# Patient Record
Sex: MALE | Race: OTHER | NOT HISPANIC OR LATINO | Employment: FULL TIME | URBAN - METROPOLITAN AREA
[De-identification: names, ages, dates, MRNs, and addresses within clinical notes are randomized per-mention and may not be internally consistent; named-entity substitution may affect disease eponyms.]

---

## 2017-07-05 ENCOUNTER — APPOINTMENT (OUTPATIENT)
Dept: LAB | Age: 52
End: 2017-07-05
Payer: COMMERCIAL

## 2017-07-05 ENCOUNTER — TRANSCRIBE ORDERS (OUTPATIENT)
Dept: ADMINISTRATIVE | Age: 52
End: 2017-07-05

## 2017-07-05 DIAGNOSIS — Z00.8 HEALTH EXAMINATION IN POPULATION SURVEY: Primary | ICD-10-CM

## 2017-07-05 DIAGNOSIS — Z00.8 HEALTH EXAMINATION IN POPULATION SURVEY: ICD-10-CM

## 2017-07-05 LAB
CHOLEST SERPL-MCNC: 199 MG/DL (ref 50–200)
EST. AVERAGE GLUCOSE BLD GHB EST-MCNC: 105 MG/DL
HBA1C MFR BLD: 5.3 % (ref 4.2–6.3)
HDLC SERPL-MCNC: 38 MG/DL (ref 40–60)
LDLC SERPL CALC-MCNC: 135 MG/DL (ref 0–100)
TRIGL SERPL-MCNC: 128 MG/DL

## 2017-07-05 PROCEDURE — 36415 COLL VENOUS BLD VENIPUNCTURE: CPT

## 2017-07-05 PROCEDURE — 83036 HEMOGLOBIN GLYCOSYLATED A1C: CPT

## 2017-07-05 PROCEDURE — 80061 LIPID PANEL: CPT

## 2017-08-29 ENCOUNTER — TRANSCRIBE ORDERS (OUTPATIENT)
Dept: ADMINISTRATIVE | Facility: HOSPITAL | Age: 52
End: 2017-08-29

## 2017-08-29 DIAGNOSIS — Z82.49 FAMILY HISTORY OF CAROTID ARTERY STENOSIS: Primary | ICD-10-CM

## 2017-08-30 ENCOUNTER — HOSPITAL ENCOUNTER (OUTPATIENT)
Dept: NON INVASIVE DIAGNOSTICS | Facility: CLINIC | Age: 52
Discharge: HOME | End: 2017-08-30
Payer: COMMERCIAL

## 2017-08-30 DIAGNOSIS — Z82.49 FAMILY HISTORY OF CAROTID ARTERY STENOSIS: ICD-10-CM

## 2017-08-30 PROCEDURE — 93880 EXTRACRANIAL BILAT STUDY: CPT

## 2017-10-09 ENCOUNTER — OFFICE VISIT (OUTPATIENT)
Dept: URGENT CARE | Age: 52
End: 2017-10-09
Payer: COMMERCIAL

## 2017-10-09 PROCEDURE — S9083 URGENT CARE CENTER GLOBAL: HCPCS | Performed by: FAMILY MEDICINE

## 2017-10-09 PROCEDURE — G0382 LEV 3 HOSP TYPE B ED VISIT: HCPCS | Performed by: FAMILY MEDICINE

## 2017-10-10 NOTE — PROGRESS NOTES
Assessment  1  Poison ivy (692 6) (L23 7)    Plan  Poison ivy    · PredniSONE 10 MG Oral Tablet; TAKE 6 TABLETS TODAY, THEN DECREASE BY 1  TABLET EACH DAY UNTIL GONE   · MethylPREDNISolone Acetate 40 MG/ML Injection Suspension; INJECT 2  ML  Intramuscular give now; To Be Done: 43VLW0228   · Apply an ice pack as needed for pain twice a day for 20 minutes ; Status:Complete;    Done: 25MIW2010   · Continue with our present treatment plan ; Status:Complete;   Done: 87LHU5473   · Resume activity to your tolerance ; Status:Complete;   Done: 26JQT3162    Discussion/Summary  Discussion Summary:   Continue over-the-counter Benadryl as directed  Medication Side Effects Reviewed: Possible side effects of new medications were reviewed with the patient/guardian today  Understands and agrees with treatment plan: The treatment plan was reviewed with the patient/guardian  The patient/guardian understands and agrees with the treatment plan   Counseling Documentation With Imm: The patient was counseled regarding instructions for management,-prognosis,-patient and family education,-impressions,-risks and benefits of treatment options,-importance of compliance with treatment  Follow Up Instructions: Follow Up with your Primary Care Provider in 3-4 days  If your symptoms worsen, go to the nearest [place] [address] Emergency Department  Chief Complaint  1  Rash  Chief Complaint Free Text Note Form: Since Friday, has red, flat rash on hands, arms and face -(around cheeks, nose , lower eye lids and forehead(  Very itchy,  Removing shrubs from home  Took Benadryl 50 mg at 6 am       History of Present Illness  HPI: Patient was working in his yard removing shrubs from his home when he noted a couple days later with had a rash on his face, arms and hands   Rash is itchy and he has swelling around his eyes Stella, which is relieved with Benadryl   Hospital Based Practices Required Assessment:   Pain Assessment   the patient states they have pain  The pain is located in the rash  The patient describes the pain as itchy  (on a scale of 0 to 10, the patient rates the pain at 8 )   Abuse And Domestic Violence Screen    Yes, the patient is safe at home -The patient states no one is hurting them  Depression And Suicide Screen  No, the patient has not had thoughts of hurting themself  No, the patient has not felt depressed in the past 7 days  Prefered Language is  [place]  Primary Language is  English  Rash: Yfn Tobar presents with complaints of rash  Associated symptoms include skin blistering-and-pruritus, but-no skin bumps,-no cracking,-no crusting,-no draining,-no skin dryness,-no skin oiliness,-no pain,-no skin redness,-no skin scaling,-no skin swelling,-no skin ulceration,-no nausea,-no pustules,-no purulent drainage-and-no serous discharge  Review of Systems  Focused-Male:   Constitutional: as noted in HPI    ENT: as noted in HPI  Musculoskeletal: as noted in HPI  Integumentary: as noted in HPI  Active Problems  1  Hypertension (401 9) (I10)   2  Seasonal allergies (477 9) (J30 2)    Social History   · Denied: History of Drug use   · Never a smoker   · Social alcohol use (Z78 9)  Social History Reviewed: The social history was reviewed and updated today  Surgical History  1  History of Knee Arthroscopy With Medial Meniscectomy   2  History of Laminectomy Lumbar   3  History of Primary Repair Of Knee Ligament Cruciate Anterior  Surgical History Reviewed: The surgical history was reviewed and updated today  Current Meds   1  Allegra Allergy 180 MG Oral Tablet; Therapy: (Recorded:27Wsg6765) to Recorded   2  Lisinopril 10 MG Oral Tablet; Therapy: ([phone]) to Recorded  Medication List Reviewed: The medication list was reviewed and updated today  Allergies  1   No Known Drug Allergies    Vitals  Signs   Recorded: 09VVW6829 11:29AM   Temperature: 98 F, Oral  Heart Rate: 83  Pulse Quality: Regular  Respiration: 18  Systolic: 412, RUE, Sitting  Diastolic: 76, RUE, Sitting  Height: 5 ft 11 in  Weight: 247 lb 3 2 oz  BMI Calculated: 34 48  BSA Calculated: 2 31  O2 Saturation: 97  Pain Scale: 8    Physical Exam    Constitutional   General appearance: No acute distress, well appearing and well nourished  Eyes   Conjunctiva and lids: No swelling, erythema, or discharge  Pupils and irises: Equal, round and reactive to light  Skin Maculopapular vesicular rash on the patient's arms, hands, neck and face  Neurologic   Sensation: No sensory loss      Psychiatric   Orientation to person, place and time: Normal     Mood and affect: Normal        Signatures   Electronically signed by : MARY Pierre; Oct  9 2017 11:56AM EST                       (Author)    Electronically signed by : Jo Ann Carrasquillo DO; Oct  9 2017  2:57PM EST                       (Co-author)

## 2017-10-26 ENCOUNTER — APPOINTMENT (OUTPATIENT)
Dept: PHYSICAL THERAPY | Facility: OTHER | Age: 52
End: 2017-10-26
Payer: COMMERCIAL

## 2017-10-26 PROCEDURE — G8991 OTHER PT/OT GOAL STATUS: HCPCS

## 2017-10-26 PROCEDURE — 97161 PT EVAL LOW COMPLEX 20 MIN: CPT

## 2017-10-26 PROCEDURE — G8990 OTHER PT/OT CURRENT STATUS: HCPCS

## 2017-10-26 PROCEDURE — 97140 MANUAL THERAPY 1/> REGIONS: CPT

## 2017-10-31 ENCOUNTER — APPOINTMENT (OUTPATIENT)
Dept: PHYSICAL THERAPY | Facility: OTHER | Age: 52
End: 2017-10-31
Payer: COMMERCIAL

## 2017-10-31 PROCEDURE — 97110 THERAPEUTIC EXERCISES: CPT

## 2017-10-31 PROCEDURE — 97140 MANUAL THERAPY 1/> REGIONS: CPT

## 2017-11-02 ENCOUNTER — APPOINTMENT (OUTPATIENT)
Dept: PHYSICAL THERAPY | Facility: OTHER | Age: 52
End: 2017-11-02
Payer: COMMERCIAL

## 2017-11-02 PROCEDURE — 97140 MANUAL THERAPY 1/> REGIONS: CPT

## 2017-11-02 PROCEDURE — 97110 THERAPEUTIC EXERCISES: CPT

## 2017-11-02 PROCEDURE — 97112 NEUROMUSCULAR REEDUCATION: CPT

## 2017-11-05 ENCOUNTER — OFFICE VISIT (OUTPATIENT)
Dept: URGENT CARE | Age: 52
End: 2017-11-05
Payer: COMMERCIAL

## 2017-11-05 PROCEDURE — S9083 URGENT CARE CENTER GLOBAL: HCPCS | Performed by: FAMILY MEDICINE

## 2017-11-05 PROCEDURE — G0382 LEV 3 HOSP TYPE B ED VISIT: HCPCS | Performed by: FAMILY MEDICINE

## 2017-11-07 ENCOUNTER — APPOINTMENT (OUTPATIENT)
Dept: PHYSICAL THERAPY | Facility: OTHER | Age: 52
End: 2017-11-07
Payer: COMMERCIAL

## 2017-11-07 NOTE — PROGRESS NOTES
Assessment  1  Acute sinusitis (461 9) (J01 90)   2  Acute conjunctivitis (372 00) (H10 30)    Plan  Acute conjunctivitis    · Tobramycin 0 3 % Ophthalmic Solution; INITIALLY 2 DROPS EVERY HOUR FOR  DAY 1, THEN 2 DROPS 4 TIMES DAILY  Acute sinusitis    · Amoxicillin-Pot Clavulanate 875-125 MG Oral Tablet (Augmentin); Take 1 tablet  twice daily    Discussion/Summary  Discussion Summary:   Start antibiotic and take as directed  Use drops in eyes  Wash hands frequently to prevent spread of infection  If symptoms are not improving over the next 5-7 days, follow with PCP  Medication Side Effects Reviewed: Possible side effects of new medications were reviewed with the patient/guardian today  Understands and agrees with treatment plan: The treatment plan was reviewed with the patient/guardian  The patient/guardian understands and agrees with the treatment plan   Follow Up Instructions: Follow Up with your Primary Care Provider in 5-7 days  If your symptoms worsen, go to the nearest [facility] Emergency Department  Chief Complaint  1  Cough  Chief Complaint Free Text Note Form: pt reports cough since Monday and unable to sleep due to the post nasal drip      History of Present Illness  HPI: 76-year-old male here with complaint of cough for the last week  Unable to sleep due to postnasal drip  Denies any fever or chills  Cough is productive with yellow sputum  Also has sinus pressure and headaches  Feels like his eyes were pasted shut this morning  Hospital Based Practices Required Assessment:   Pain Assessment   the patient states they do not have pain  (on a scale of 0 to 10, the patient rates the pain at 0 )   Abuse And Domestic Violence Screen    Yes, the patient is safe at home  -- The patient states no one is hurting them  Depression And Suicide Screen  No, the patient has not had thoughts of hurting themself  No, the patient has not felt depressed in the past 7 days     Prefered Language is English  Primary Language is  English  Cough: Jose Martin Lebron presents with complaints of cough  Associated symptoms include runny nose,-- stuffy nose,-- sore throat,-- postnasal drainage-- and-- eye itching, but-- no dyspnea,-- no wheezing,-- no chills-- and-- no fever  Review of Systems  Focused-Male:   Constitutional: feeling poorly-- and-- feeling tired, but-- no fever-- and-- no chills  ENT: sore throat-- and-- nasal discharge, but-- as noted in HPI  Cardiovascular: no complaints of slow or fast heart rate, no chest pain, no palpitations, no leg claudication or lower extremity edema  Respiratory: cough, but-- no shortness of breath-- and-- no wheezing  ROS Reviewed:   ROS reviewed  Active Problems  1  Hypertension (401 9) (I10)   2  Poison ivy (692 6) (L23 7)   3  Seasonal allergies (477 9) (J30 2)    Past Medical History  Active Problems And Past Medical History Reviewed: The active problems and past medical history were reviewed and updated today  Family History  Family History Reviewed: The family history was reviewed and updated today  Social History   · Denied: History of Drug use   · Never a smoker   · Social alcohol use (Z78 9)  Social History Reviewed: The social history was reviewed and updated today  The social history was reviewed and is unchanged  Surgical History  1  History of Knee Arthroscopy With Medial Meniscectomy   2  History of Laminectomy Lumbar   3  History of Primary Repair Of Knee Ligament Cruciate Anterior  Surgical History Reviewed: The surgical history was reviewed and updated today  Current Meds   1  Lisinopril 10 MG Oral Tablet; Therapy: (Recorded:09Oct2017) to Recorded   2  Zyrtec TABS; Therapy: [phone]) to Recorded  Medication List Reviewed: The medication list was reviewed and updated today  Allergies  1   No Known Drug Allergies    Vitals  Signs   Recorded: 22LBC9568 08:53AM   Temperature: 98 2 F, Tympanic  Heart Rate: 92, L Radial  Pulse Quality: Regular, L Radial  Respiration Quality: Normal  Respiration: 18  Systolic: 952, LUE, Sitting  Diastolic: 88, LUE, Sitting  Height: 5 ft 11 in  Weight: 245 lb   BMI Calculated: 34 17  BSA Calculated: 2 3  O2 Saturation: 95, RA  Pain Scale: 0/10    Physical Exam    Constitutional   General appearance: No acute distress, well appearing and well nourished  Eyes   Conjunctiva and lids: Abnormal   Conjunctiva Findings: bilateral hyperemia-- and-- purulent discharge bilaterally  Ears, Nose, Mouth, and Throat   External inspection of ears and nose: Normal     Otoscopic examination: Tympanic membrance translucent with normal light reflex  Canals patent without erythema  Nasal mucosa, septum, and turbinates: Abnormal   There was a purulent discharge from both nares  The bilateral nasal mucosa was edematous-- and-- red  Oropharynx: Abnormal   The posterior pharynx was erythematous, but-- did not have an exudate  Pulmonary   Respiratory effort: No increased work of breathing or signs of respiratory distress  Auscultation of lungs: Clear to auscultation  Cardiovascular   Auscultation of heart: Normal rate and rhythm, normal S1 and S2, without murmurs         Signatures   Electronically signed by : Lou Bryant, [facility]; Nov 5 2017  8:58AM EST                       (Author)    Electronically signed by : ANNIE Carlin ; Nov 6 2017 10:29AM EST                       (Co-author)

## 2017-11-09 ENCOUNTER — APPOINTMENT (OUTPATIENT)
Dept: PHYSICAL THERAPY | Facility: OTHER | Age: 52
End: 2017-11-09
Payer: COMMERCIAL

## 2017-11-09 PROCEDURE — 97110 THERAPEUTIC EXERCISES: CPT

## 2017-11-09 PROCEDURE — 97112 NEUROMUSCULAR REEDUCATION: CPT

## 2017-11-14 ENCOUNTER — APPOINTMENT (OUTPATIENT)
Dept: PHYSICAL THERAPY | Facility: OTHER | Age: 52
End: 2017-11-14
Payer: COMMERCIAL

## 2017-11-16 ENCOUNTER — APPOINTMENT (OUTPATIENT)
Dept: PHYSICAL THERAPY | Facility: OTHER | Age: 52
End: 2017-11-16
Payer: COMMERCIAL

## 2017-11-21 ENCOUNTER — APPOINTMENT (OUTPATIENT)
Dept: PHYSICAL THERAPY | Facility: OTHER | Age: 52
End: 2017-11-21
Payer: COMMERCIAL

## 2017-11-21 PROCEDURE — 97112 NEUROMUSCULAR REEDUCATION: CPT

## 2017-11-21 PROCEDURE — 97140 MANUAL THERAPY 1/> REGIONS: CPT

## 2017-11-21 PROCEDURE — 97110 THERAPEUTIC EXERCISES: CPT

## 2017-11-21 PROCEDURE — G8990 OTHER PT/OT CURRENT STATUS: HCPCS

## 2017-11-21 PROCEDURE — G8991 OTHER PT/OT GOAL STATUS: HCPCS

## 2017-12-13 ENCOUNTER — OFFICE VISIT (OUTPATIENT)
Dept: URGENT CARE | Age: 52
End: 2017-12-13
Payer: COMMERCIAL

## 2017-12-13 PROCEDURE — G0382 LEV 3 HOSP TYPE B ED VISIT: HCPCS | Performed by: FAMILY MEDICINE

## 2017-12-13 PROCEDURE — S9083 URGENT CARE CENTER GLOBAL: HCPCS | Performed by: FAMILY MEDICINE

## 2017-12-15 NOTE — PROGRESS NOTES
Assessment  1  Acute laryngitis (464 00) (J04 0)   2  Acute upper respiratory infection (465 9) (J06 9)    Plan  Acute upper respiratory infection    · Azithromycin 250 MG Oral Tablet; TAKE 2 TABLETS ON DAY 1 THEN TAKE 1TABLET A DAY FOR 4 DAYS    Discussion/Summary  Discussion Summary:   Take antibiotic as directed until completed  Take antibiotic with food and full glass of water  Take a probiotic while taking this medication  Take Mucinex as directed, for congestion  Take Advil or Tylenol as directed for muscle aches or fever  Use OTC nasal steroid such as Flonase, as directed  Use cool mist humidifier, turning on hours prior to bedtime for maximum relief  Take all medications with food and a full glass of water  Get plenty of rest and lots of fluids  Use throat lozenges, saltwater gargle, and warm honey water as needed for throat relief  If symptoms worsen or if not resolving, call PCP or go to the ER  Medication Side Effects Reviewed: Possible side effects of new medications were reviewed with the patient/guardian today  Understands and agrees with treatment plan: The treatment plan was reviewed with the patient/guardian  The patient/guardian understands and agrees with the treatment plan   Counseling Documentation With Imm: The patient was counseled regarding instructions for management,-- risk factor reductions,-- patient and family education,-- impressions  Follow Up Instructions: Follow Up with your Primary Care Provider in 7 days  If your symptoms worsen, go to the nearest [place] [address] Emergency Department  Chief Complaint  1  Cold Symptoms  Chief Complaint Free Text Note Form: x 1 week - laryngitis with harsh cough and nasal congestion with PND  Taking Allegra  Denies sore throat, ear pain or fever  Had Flu vaccine  History of Present Illness  HPI: Patient is a 48 y/o male presenting with complaint of loss of voice x 1 week   Sx  associated with bilateral maxillary sinus pressure, nasal congestion, and PND No F/C, sore throat, ear ache/pressure, or HA  He has been treating with Allegra with no relief  Sx  feel as though they're worsening  No sick contacts  No recent travel  Hospital Based Practices Required Assessment:  Pain Assessment  the patient states they have pain  The pain is located in the cough  (on a scale of 0 to 10, the patient rates the pain at 5 )  Abuse And Domestic Violence Screen   Yes, the patient is safe at home  -- The patient states no one is hurting them  Depression And Suicide Screen  No, the patient has not had thoughts of hurting themself  No, the patient has not felt depressed in the past 7 days  Prefered Language is  [place]  Primary Language is  English  Review of Systems  Focused-Male:  Constitutional: feeling poorly-- and-- feeling tired, but-- no fever-- and-- no chills  Cardiovascular: no chest pain-- and-- no palpitations  Respiratory: no shortness of breath-- and-- no wheezing  Gastrointestinal: no abdominal pain,-- no nausea,-- no vomiting-- and-- no diarrhea  Musculoskeletal: no arthralgias-- and-- no myalgias  Integumentary: no rashes  ROS Reviewed:   ROS reviewed  Active Problems  1  Acute conjunctivitis (372 00) (H10 30)   2  Acute sinusitis (461 9) (J01 90)   3  Hypertension (401 9) (I10)   4  Poison ivy (692 6) (L23 7)   5  Seasonal allergies (477 9) (J30 2)    Past Medical History  Active Problems And Past Medical History Reviewed: The active problems and past medical history were reviewed and updated today  Family History  Family History Reviewed: The family history was reviewed and updated today  Social History   · Denied: History of Drug use   · Never a smoker   · Social alcohol use (Z78 9)  Social History Reviewed: The social history was reviewed and updated today  The social history was reviewed and is unchanged  Surgical History  1  History of Knee Arthroscopy With Medial Meniscectomy   2   History of Laminectomy Lumbar   3  History of Primary Repair Of Knee Ligament Cruciate Anterior  Surgical History Reviewed: The surgical history was reviewed and updated today  Current Meds   1  Allegra 180 MG TABS; Therapy: (Recorded:[id number]) to Recorded   2  Lisinopril 10 MG Oral Tablet; Therapy: [phone]) to Recorded  Medication List Reviewed: The medication list was reviewed and updated today  Allergies  1  No Known Drug Allergies    Vitals  Signs   Recorded: 12Evc7770 07:52PM   Temperature: 97 8 F, Oral  Heart Rate: 74  Pulse Quality: Regular  Respiration: 18  Systolic: 509, RUE, Sitting  Diastolic: 90, RUE, Sitting  Height: 5 ft 11 in  Weight: 243 lb   BMI Calculated: 33 89  BSA Calculated: 2 29  O2 Saturation: 97  Pain Scale: 5    Physical Exam   Constitutional  General appearance: No acute distress, well appearing and well nourished  Eyes  Conjunctiva and lids: No swelling, erythema, or discharge  Ears, Nose, Mouth, and Throat  External inspection of ears and nose: Normal    Otoscopic examination: Tympanic membrance translucent with normal light reflex  Canals patent without erythema  -- Nasal mucosa mildly erythematous  No edema  -- Erythematous, no edema, exudate, lesions, or petechiae  Pulmonary  Respiratory effort: No increased work of breathing or signs of respiratory distress  Auscultation of lungs: Clear to auscultation  Cardiovascular  Auscultation of heart: Normal rate and rhythm, normal S1 and S2, without murmurs  Lymphatic Swollen, nontender glands, bilaterally  Musculoskeletal  Gait and station: Normal    Digits and nails: Normal without clubbing or cyanosis  Skin  Skin and subcutaneous tissue: Normal without rashes or lesions  Neurologic No focal deficits  Reflexes: 2+ and symmetric  Sensation: No sensory loss     Psychiatric  Orientation to person, place and time: Normal    Mood and affect: Normal        Provider Comments  Provider Comments:   Notified patient that antibiotic will continue to work 5 days after completion  All questions answered  Precautions given        Signatures   Electronically signed by : Jose Walker, [facility]; Dec 14 2017  5:48PM EST                       (Author)    Electronically signed by : Vishal Sparks DO; Dec 14 2017  5:53PM EST                       (Co-author)

## 2018-01-23 VITALS
DIASTOLIC BLOOD PRESSURE: 90 MMHG | HEIGHT: 71 IN | SYSTOLIC BLOOD PRESSURE: 142 MMHG | HEART RATE: 74 BPM | WEIGHT: 243 LBS | OXYGEN SATURATION: 97 % | RESPIRATION RATE: 18 BRPM | BODY MASS INDEX: 34.02 KG/M2 | TEMPERATURE: 97.8 F

## 2018-07-24 ENCOUNTER — APPOINTMENT (OUTPATIENT)
Dept: LAB | Age: 53
End: 2018-07-24
Payer: COMMERCIAL

## 2018-07-24 ENCOUNTER — TRANSCRIBE ORDERS (OUTPATIENT)
Dept: ADMINISTRATIVE | Age: 53
End: 2018-07-24

## 2018-07-24 DIAGNOSIS — Z00.8 HEALTH EXAMINATION IN POPULATION SURVEY: ICD-10-CM

## 2018-07-24 DIAGNOSIS — Z00.8 HEALTH EXAMINATION IN POPULATION SURVEY: Primary | ICD-10-CM

## 2018-07-24 LAB
CHOLEST SERPL-MCNC: 178 MG/DL (ref 50–200)
EST. AVERAGE GLUCOSE BLD GHB EST-MCNC: 117 MG/DL
HBA1C MFR BLD: 5.7 % (ref 4.2–6.3)
HDLC SERPL-MCNC: 33 MG/DL (ref 40–60)
LDLC SERPL CALC-MCNC: 107 MG/DL (ref 0–100)
NONHDLC SERPL-MCNC: 145 MG/DL
TRIGL SERPL-MCNC: 188 MG/DL

## 2018-07-24 PROCEDURE — 83036 HEMOGLOBIN GLYCOSYLATED A1C: CPT

## 2018-07-24 PROCEDURE — 80061 LIPID PANEL: CPT

## 2018-07-24 PROCEDURE — 36415 COLL VENOUS BLD VENIPUNCTURE: CPT

## 2019-04-16 ENCOUNTER — TELEPHONE (OUTPATIENT)
Dept: INTERNAL MEDICINE | Facility: CLINIC | Age: 54
End: 2019-04-16

## 2019-07-25 RX ORDER — CETIRIZINE HYDROCHLORIDE 10 MG/1
TABLET ORAL
COMMUNITY
End: 2019-07-30

## 2019-07-25 RX ORDER — AMOXICILLIN AND CLAVULANATE POTASSIUM 875; 125 MG/1; MG/1
1 TABLET, FILM COATED ORAL 2 TIMES DAILY
COMMUNITY
Start: 2017-11-05 | End: 2019-07-30

## 2019-07-25 RX ORDER — TOBRAMYCIN 3 MG/ML
2 SOLUTION/ DROPS OPHTHALMIC 4 TIMES DAILY
COMMUNITY
Start: 2017-11-05 | End: 2019-07-30

## 2019-07-25 RX ORDER — LISINOPRIL 10 MG/1
10 TABLET ORAL DAILY
COMMUNITY
Start: 2019-02-27 | End: 2020-06-26 | Stop reason: ALTCHOICE

## 2019-07-25 RX ORDER — FEXOFENADINE HCL 180 MG/1
180 TABLET ORAL DAILY
COMMUNITY
Start: 2019-02-27

## 2019-07-25 RX ORDER — AZITHROMYCIN 250 MG/1
TABLET, FILM COATED ORAL DAILY
COMMUNITY
Start: 2017-12-13 | End: 2019-07-30

## 2019-07-30 ENCOUNTER — APPOINTMENT (OUTPATIENT)
Dept: RADIOLOGY | Facility: OTHER | Age: 54
End: 2019-07-30
Payer: COMMERCIAL

## 2019-07-30 VITALS
SYSTOLIC BLOOD PRESSURE: 142 MMHG | HEART RATE: 77 BPM | DIASTOLIC BLOOD PRESSURE: 89 MMHG | WEIGHT: 245.6 LBS | BODY MASS INDEX: 34.38 KG/M2 | HEIGHT: 71 IN

## 2019-07-30 DIAGNOSIS — M75.41 SHOULDER IMPINGEMENT SYNDROME, RIGHT: Primary | ICD-10-CM

## 2019-07-30 DIAGNOSIS — M25.511 RIGHT SHOULDER PAIN, UNSPECIFIED CHRONICITY: ICD-10-CM

## 2019-07-30 PROCEDURE — 73030 X-RAY EXAM OF SHOULDER: CPT

## 2019-07-30 PROCEDURE — 99203 OFFICE O/P NEW LOW 30 MIN: CPT | Performed by: ORTHOPAEDIC SURGERY

## 2019-07-30 NOTE — PROGRESS NOTES
Assessment  Diagnoses and all orders for this visit:    Shoulder impingement syndrome, right      Discussion and Plan:    We did discuss with the patient due to lack of any real formal treatment outside of resting his right shoulderat this time we do recommend formal course of physical therapy which would include shoulder range of motion, strengthening and scapular stabilizing exercises  He may continue with over-the-counter medication such as Motrin Aleve or Advil as needed for pain  We will see him back in 2 months for re-evaluation  Subjective:   Patient ID: Chica Obando is a 53 y o  male      Patient is a 76-year-old male who is right-hand-dominant who complains of right shoulder pain that has been going on for the past 8 months  Patient states that he was playing ice hockey on December 26, 2018 when he tripped on an uneven surface getting off of the bench when he fell on an outstretched hand  Patient states that he continued playing for about 30 minutes and then he had to have his teammates help him take off his equipment  He states that he did rest for 6-8 weeks after this injury however his shoulder pain never improved  He reaggravated his right shoulder 1 week ago when he was painting at his home  Patient describes his pain as moderate intermittent sharp in nature and states that when he sneezes he notices a pop and pain localized to his posterior shoulder  He denies numbness and tingling  The pain is not waking him from sleep at night  He has not treat his shoulder in any other way besides rest for the initial 6-8 weeks  The following portions of the patient's history were reviewed and updated as appropriate: allergies, current medications, past family history, past medical history, past social history, past surgical history and problem list     Review of Systems   Constitutional: Negative  HENT: Negative  Eyes: Negative  Respiratory: Negative  Cardiovascular: Negative  Gastrointestinal: Negative  Endocrine: Negative  Genitourinary: Negative  Musculoskeletal:        As noted in HPI   Skin: Negative  Allergic/Immunologic: Negative  Neurological: Negative  Hematological: Negative  Psychiatric/Behavioral: Negative  Objective:  Ortho Exam   No tenderness to AC joint, negative neer, negative bender, negative speed, negative gil, supraspinatus 4+/5, infraspinatus 5/5, subscapularis 5/5, , ER 60, IR T8, painful at end ranges of motion, NVI     Physical Exam   Constitutional: He is oriented to person, place, and time  He appears well-developed  Eyes: Pupils are equal, round, and reactive to light  Neck: Normal range of motion  Cardiovascular: Normal rate  Pulmonary/Chest: Effort normal    Neurological: He is alert and oriented to person, place, and time  Skin: Skin is warm  Psychiatric: He has a normal mood and affect  I have personally reviewed pertinent films in PACS and my interpretation is as follows  X-ray of right shoulder on 07/30/2019 demonstrates a well located glenohumeral joint without evidence of acute fractures or dislocations      Scribe Attestation    I,:   Stanton Avila am acting as a scribe while in the presence of the attending physician :        I,:   Alexey Driscoll MD personally performed the services described in this documentation    as scribed in my presence :

## 2019-08-01 NOTE — PROGRESS NOTES
PT Evaluation     Today's date: 2019  Patient name: Jaron Nunn  : 1965  MRN: [id number]  Referring provider: Michael Mazariegos MD  Dx:   Encounter Diagnosis     ICD-10-CM    1  Shoulder impingement syndrome, right M75 41 Ambulatory referral to Physical Therapy       Start Time: 1045  Stop Time: 1145  Total time in clinic (min): 60 minutes    Assessment  Assessment details: Jaron Nunn is a 53 y o  male who presents with complaints of  Shoulder impingement syndrome, right  (primary encounter diagnosis)  No further referral appears necessary at this time based upon examination results  Patient presents to PT with impaired strength, impaired ROM, decreased flexibility and impaired ability to complete IADLs  Prognosis is good given HEP compliance and PT 2-3x/wk  Positive prognostic indicators include positive attitude toward recovery  Please contact me if you have any questions or recommendations  Thank you for the opportunity to share in  Migdalia Fearing care       Impairments: abnormal muscle firing, abnormal or restricted ROM, abnormal movement, activity intolerance, impaired physical strength, lacks appropriate home exercise program, pain with function, safety issue, scapular dyskinesis, weight-bearing intolerance, poor posture  and poor body mechanics    Symptom irritability: moderateBarriers to therapy: None  Understanding of Dx/Px/POC: good   Prognosis: good    Goals  Short Term:  Pt will report decreased levels of pain by at least 2 subjective ratings in 4 weeks  Pt will demonstrate improved ROM by at least 10 degrees in 4 weeks  Pt will demonstrate improved strength by 1/2 grade  Long Term:   Pt will be independent in their HEP in 8 weeks  Pt will be be pain free with IADL's  Pt will demonstrate improved FOTO score       Plan  Patient would benefit from: skilled physical therapy  Planned modality interventions: cryotherapy, electrical stimulation/Russian stimulation, low level laser therapy and thermotherapy: hydrocollator packs  Planned therapy interventions: abdominal trunk stabilization, joint mobilization, manual therapy, balance, neuromuscular re-education, patient education, strengthening, stretching, therapeutic activities, therapeutic exercise, flexibility, functional ROM exercises and home exercise program  Frequency: 2x week  Duration in weeks: 12  Plan of Care beginning date: 8/6/2019  Plan of Care expiration date: 11/6/2019  Treatment plan discussed with: patient        Subjective Evaluation    History of Present Illness  Mechanism of injury: Patient reports the day after Christmas he was playing hockey and he hurt his shoulder  He said that he thought it would get better on it's own but it did not  He went to see Dr Augie Nichols recently and underwent an examination  He does not have any structural damage according to MD  X-ray came back negative  Patent includes PMH of biceps tendonitis (cyst)  He said it can still give him issues at times especially if he plays basketball and does a lot of overhead activities  R Shoulder  Currently 0/10  At Best 0/10  At [facility] [place] 4/10  Patient described the pain as a dull ache  Patient said when he sneezes he feels a jolt in his R shoulder  Aggravating: not exactly sure because he has modified what he has been doing to avoid pain, overhead activities  Relieving: nothing            Not a recurrent problem   Quality of life: fair    Treatments  No previous or current treatments  Current treatment: physical therapy  Patient Goals  Patient goals for therapy: decreased pain, increased motion, increased strength, return to sport/leisure activities and independence with ADLs/IADLs  Patient goal: "I want to get back to playing hockey "          Objective     General Comments:      Shoulder Comments   Observation:    Forward head, forward shoulders    Scapular Wall Slide   R scapula upwardly rotates more than L scapula; goes past midline ROM  AROM   Flexion 25% limited  Extension 25% limited  Scaption 50% limited  External Rotation R T2 L T2  Internal Rotation R L3 L T10  PROM   Flexion WNL but ERP! Scaption WNL but ERP!    External Rotation 25% limited   Internal Rotation 50% limited    Strength  Flexion R 4/5 L 5/5   Extension 5/5  External Rotation R 4/5 L 5/5   Internal Rotation R 5/5 L 5/5   Scaption R 4/5 L 5/5    Joint Mobility  SCJ -   ACJ R hypomobile and P!; L hypomobile   GHJ:   Posterior R hypomobile L WNL   Anterior R hypomobile L WNL   Inferior WNL   Superior WNL     Special Tests:   HK +, Active Compression -, IR - , ACJ Compression -, Horizontal Adduction -, Scapular Repositioning + (decreased pain with shoulder movements)      Precautions: hypertension, allergies, history of fractures     Daily Treatment Diary   Manual           PROM           Sh Mobs          ACJ Mobs                                Exercise Diary           TB Rows          TB Low Rows          TB ER           TB IR           UT TD           Lower Trap Framing          Prone Rows            Modalities           CP PRN

## 2019-08-06 ENCOUNTER — EVALUATION (OUTPATIENT)
Dept: PHYSICAL THERAPY | Facility: OTHER | Age: 54
End: 2019-08-06
Payer: COMMERCIAL

## 2019-08-06 DIAGNOSIS — M75.41 SHOULDER IMPINGEMENT SYNDROME, RIGHT: Primary | ICD-10-CM

## 2019-08-06 PROCEDURE — 97162 PT EVAL MOD COMPLEX 30 MIN: CPT | Performed by: PHYSICAL THERAPIST

## 2019-08-06 PROCEDURE — 97110 THERAPEUTIC EXERCISES: CPT | Performed by: PHYSICAL THERAPIST

## 2019-08-12 ENCOUNTER — OFFICE VISIT (OUTPATIENT)
Dept: PHYSICAL THERAPY | Facility: OTHER | Age: 54
End: 2019-08-12
Payer: COMMERCIAL

## 2019-08-12 DIAGNOSIS — M75.41 SHOULDER IMPINGEMENT SYNDROME, RIGHT: Primary | ICD-10-CM

## 2019-08-12 PROCEDURE — 97112 NEUROMUSCULAR REEDUCATION: CPT | Performed by: PHYSICAL MEDICINE & REHABILITATION

## 2019-08-12 PROCEDURE — 97110 THERAPEUTIC EXERCISES: CPT | Performed by: PHYSICAL MEDICINE & REHABILITATION

## 2019-08-12 PROCEDURE — 97140 MANUAL THERAPY 1/> REGIONS: CPT | Performed by: PHYSICAL MEDICINE & REHABILITATION

## 2019-08-12 NOTE — PROGRESS NOTES
Daily Note     Today's date: 2019  Patient name: Campbell Luo  : 1965  MRN: [id number]  Referring provider: Jonnathan Morris MD  Dx:   Encounter Diagnosis     ICD-10-CM    1  Shoulder impingement syndrome, right M75 41                   Subjective: Patient offers no new complaints today  Objective: See treatment diary below    Precautions: hypertension, allergies, history of fractures     Daily Treatment Diary   Manual           PROM  LH         Sh Mobs LH         ACJ Mobs                                Exercise Diary           TB Rows GTB 2x10         TB Low Rows GTB 2x10         TB ER  GTB 2x10         TB IR  GTB 2x10         UT TD  10x         Lower Trap Framing nv         Prone Rows 2#, 2x10         UBE retro 5'           Modalities           CP PRN                                    Assessment: Tolerated treatment fair  Challenged with TE as charted  Difficulty with UT TD form maintenance, lower trap framing held  Patient demonstrated fatigue post treatment and would benefit from continued PT  Plan: Progress treatment as tolerated

## 2019-08-20 ENCOUNTER — APPOINTMENT (OUTPATIENT)
Dept: PHYSICAL THERAPY | Facility: OTHER | Age: 54
End: 2019-08-20
Payer: COMMERCIAL

## 2019-08-22 ENCOUNTER — OFFICE VISIT (OUTPATIENT)
Dept: PHYSICAL THERAPY | Facility: OTHER | Age: 54
End: 2019-08-22
Payer: COMMERCIAL

## 2019-08-22 DIAGNOSIS — M75.41 SHOULDER IMPINGEMENT SYNDROME, RIGHT: Primary | ICD-10-CM

## 2019-08-22 PROCEDURE — 97110 THERAPEUTIC EXERCISES: CPT | Performed by: PHYSICAL THERAPIST

## 2019-08-22 PROCEDURE — 97112 NEUROMUSCULAR REEDUCATION: CPT | Performed by: PHYSICAL THERAPIST

## 2019-08-22 PROCEDURE — 97140 MANUAL THERAPY 1/> REGIONS: CPT | Performed by: PHYSICAL THERAPIST

## 2019-08-23 NOTE — PROGRESS NOTES
Daily Note     Today's date: 2019  Patient name: Elizabeth Knight  : 1965  MRN: [id number]  Referring provider: Janes Bustos MD  Dx:   Encounter Diagnosis     ICD-10-CM    1  Shoulder impingement syndrome, right M75 41      IEP 5310-3325  1 on 1 [id number]  IEP 8742-3611  Start Time: 1030  Stop Time: 1130  Total time in clinic (min): 60 minutes    Subjective: Patient offers no new complaints today  He reports minimal relief since last PT session but he said he also moved all of his kids into college  Continue to monitor  Objective: See treatment diary below    Precautions: hypertension, allergies, history of fractures     Daily Treatment Diary   Manual          PROM   GRC        Sh Mobs [facility]        ACJ Mobs [facility]        ACJ [facility]                    Exercise Diary          TB Rows GTB 2x10 GTB 2 x 10        TB Low Rows GTB 2x10 GTB 2 x 10        TB ER  GTB 2x10 GTB   2 x 10        TB IR  GTB 2x10 GTB 2 x 10        UT TD  10x 15 x 5"        Lower Trap Framing nv 15 x 5"         Prone Rows 2#, 2x10 --        UBE retro 5' 5'         Shrugs  20#   3 x 10 x 5"         FR/LR/Scap  #5   1 x 15 ea        Pulleys   5'  DNP        *DNP= do not perform     Assessment: Tolerated treatment fair  Patient did very well but continue monitor  No pulleys because it may flare up tendonitis in his shoulders  Patient demonstrated fatigue post treatment and would benefit from continued PT  Plan: Progress treatment as tolerated

## 2019-08-27 ENCOUNTER — OFFICE VISIT (OUTPATIENT)
Dept: PHYSICAL THERAPY | Facility: OTHER | Age: 54
End: 2019-08-27
Payer: COMMERCIAL

## 2019-08-27 DIAGNOSIS — M75.41 SHOULDER IMPINGEMENT SYNDROME, RIGHT: Primary | ICD-10-CM

## 2019-08-27 PROCEDURE — 97112 NEUROMUSCULAR REEDUCATION: CPT | Performed by: PEDIATRICS

## 2019-08-27 PROCEDURE — 97140 MANUAL THERAPY 1/> REGIONS: CPT | Performed by: PEDIATRICS

## 2019-08-27 PROCEDURE — 97110 THERAPEUTIC EXERCISES: CPT | Performed by: PEDIATRICS

## 2019-08-27 NOTE — PROGRESS NOTES
Daily Note     Today's date: 2019  Patient name: Sherrill Headley  : 1965  MRN: [id number]  Referring provider: Senait Napier MD  Dx:   Encounter Diagnosis     ICD-10-CM    1  Shoulder impingement syndrome, right M75 41            1:1 with PTA EW for duration of treatment session  Subjective: Patient offers no new complaints today  States he hasn't noticed any improvement in symptoms  Objective: See treatment diary below    Precautions: hypertension, allergies, history of fractures     Daily Treatment Diary   Manual         PROM   GR EW       Sh Mobs [facility]        ACJ Mobs [facility]        ACJ Taping  [facility]                    Exercise Diary         TB Rows GTB 2x10 GTB 2 x 10 GTB 2 x 10       TB Low Rows GTB 2x10 GTB 2 x 10 GTB 2 x 10       TB ER  GTB 2x10 GTB   2 x 10 GTB 2 x 10       TB IR  GTB 2x10 GTB 2 x 10 GTB 2 x 10       UT TD  10x 15 x 5" 15 x 5"       Lower Trap Framing nv 15 x 5"  15 x 5"       Prone Rows 2#, 2x10 --        UBE retro 5' 5'  5'       Shrugs  20#   3 x 10 x 5"  20#  3 x 10       FR/LR/Scap  #5   1 x 15 ea 5#  2 x 10 ea       Pulleys   5'  DNP        *DNP= do not perform     Assessment: Tolerated treatment fair  Pt  Requires verbal cuing throughout treatment session to correct form  Limited AROM overhead  Patient demonstrated fatigue post treatment and would benefit from continued PT  Plan: Progress treatment as tolerated

## 2019-08-29 ENCOUNTER — OFFICE VISIT (OUTPATIENT)
Dept: PHYSICAL THERAPY | Facility: OTHER | Age: 54
End: 2019-08-29
Payer: COMMERCIAL

## 2019-08-29 DIAGNOSIS — M75.41 SHOULDER IMPINGEMENT SYNDROME, RIGHT: Primary | ICD-10-CM

## 2019-08-29 PROCEDURE — 97140 MANUAL THERAPY 1/> REGIONS: CPT | Performed by: PEDIATRICS

## 2019-08-29 PROCEDURE — 97112 NEUROMUSCULAR REEDUCATION: CPT | Performed by: PEDIATRICS

## 2019-08-29 PROCEDURE — 97110 THERAPEUTIC EXERCISES: CPT | Performed by: PEDIATRICS

## 2019-08-29 NOTE — PROGRESS NOTES
Daily Note     Today's date: 2019  Patient name: Campbell Luo  : 1965  MRN: [id number]  Referring provider: Jonnathan Morris MD  Dx:   Encounter Diagnosis     ICD-10-CM    1  Shoulder impingement syndrome, right M75 41            1:1 with PTA EW for duration of treatment session  Subjective: Patient offers no new complaints today  States he was a little sore after last PT session  Objective: See treatment diary below    Precautions: hypertension, allergies, history of fractures     Daily Treatment Diary   Manual        PROM  [facility] EW EW      Sh Mobs [facility]        ACJ Mobs [facility]      ACJ Taping  [facility]                    Exercise Diary        TB Rows GTB 2x10 GTB 2 x 10 GTB 2 x 10 GTB  3 x 10      TB Low Rows GTB 2x10 GTB 2 x 10 GTB 2 x 10 GTB  3 x 10      TB ER  GTB 2x10 GTB   2 x 10 GTB 2 x 10 GTB  3 x 10      TB IR  GTB 2x10 GTB 2 x 10 GTB 2 x 10 GTB  3 x 10      UT TD  10x 15 x 5" 15 x 5" 15 x 5"      Lower Trap Framing nv 15 x 5"  15 x 5" 15 x 5"      Prone Rows 2#, 2x10 --        UBE retro 5' 5'  5' 5'      Shrugs  20#   3 x 10 x 5"  20#  3 x 10 20#  3 x 10      FR/LR/Scap  #5   1 x 15 ea 5#  2 x 10 ea 5#  2 x 10      Pulleys   5'  DNP  hold      *DNP= do not perform     Assessment: Tolerated treatment fair  Pt  Requires verbal cuing throughout treatment session to correct form  Limited AROM overhead  Added Y,T, I today  Limited ROM through the Y motion  Patient demonstrated fatigue post treatment and would benefit from continued PT  Plan: Progress treatment as tolerated

## 2019-09-03 ENCOUNTER — APPOINTMENT (OUTPATIENT)
Dept: PHYSICAL THERAPY | Facility: OTHER | Age: 54
End: 2019-09-03
Payer: COMMERCIAL

## 2019-09-05 ENCOUNTER — OFFICE VISIT (OUTPATIENT)
Dept: PHYSICAL THERAPY | Facility: OTHER | Age: 54
End: 2019-09-05
Payer: COMMERCIAL

## 2019-09-05 DIAGNOSIS — M75.41 SHOULDER IMPINGEMENT SYNDROME, RIGHT: Primary | ICD-10-CM

## 2019-09-05 PROCEDURE — 97112 NEUROMUSCULAR REEDUCATION: CPT | Performed by: PEDIATRICS

## 2019-09-05 PROCEDURE — 97110 THERAPEUTIC EXERCISES: CPT | Performed by: PEDIATRICS

## 2019-09-05 PROCEDURE — 97140 MANUAL THERAPY 1/> REGIONS: CPT | Performed by: PEDIATRICS

## 2019-09-05 NOTE — PROGRESS NOTES
Daily Note     Today's date: 2019  Patient name: Elpidio Choi  : 1965  MRN: [id number]  Referring provider: James Parker MD  Dx:   Encounter Diagnosis     ICD-10-CM    1  Shoulder impingement syndrome, right M75 41            1:1 with PTA EW for duration of treatment session  Subjective: Patient offers no new complaints today  Objective: See treatment diary below    Precautions: hypertension, allergies, history of fractures     Daily Treatment Diary   Manual   9/5     PROM  [facility] EW EW EW     Sh Mobs [facility]        ACJ Mobs [facility]      ACJ Taping  [facility]                    Exercise Diary   9/5     TB Rows GTB 2x10 GTB 2 x 10 GTB 2 x 10 GTB  3 x 10 BTB  3 x 10     TB Low Rows GTB 2x10 GTB 2 x 10 GTB 2 x 10 GTB  3 x 10 BTB  3 x 10     TB ER  GTB 2x10 GTB   2 x 10 GTB 2 x 10 GTB  3 x 10 BTB  3 x 10     TB IR  GTB 2x10 GTB 2 x 10 GTB 2 x 10 GTB  3 x 10 BTB  3 x 10     UT TD  10x 15 x 5" 15 x 5" 15 x 5" 5" x 15     Lower Trap Framing nv 15 x 5"  15 x 5" 15 x 5" OTB  5" x 15     Prone Rows 2#, 2x10 --        UBE retro 5' 5'  5' 5' 5'     Shrugs  20#   3 x 10 x 5"  20#  3 x 10 20#  3 x 10 20#  3 x 10     FR/LR/Scap  #5   1 x 15 ea 5#  2 x 10 ea 5#  2 x 10 5#  3 x 10     Pulleys   5'  DNP  hold      Y,T,I    15x NP-resume     *DNP= do not perform     Assessment: Tolerated treatment fair  Able to progress repetitions and resistance today  Pt  Requires cuing to correct upright posture throughout treatment session  States he was a little sore after last PT session  Inadvertently missed Y,T,I  Resume NV   Patient demonstrated fatigue post treatment and would benefit from continued PT  Plan: Progress treatment as tolerated

## 2019-09-10 ENCOUNTER — OFFICE VISIT (OUTPATIENT)
Dept: PHYSICAL THERAPY | Facility: OTHER | Age: 54
End: 2019-09-10
Payer: COMMERCIAL

## 2019-09-10 DIAGNOSIS — M75.41 SHOULDER IMPINGEMENT SYNDROME, RIGHT: Primary | ICD-10-CM

## 2019-09-10 PROCEDURE — 97140 MANUAL THERAPY 1/> REGIONS: CPT | Performed by: PEDIATRICS

## 2019-09-10 PROCEDURE — 97110 THERAPEUTIC EXERCISES: CPT | Performed by: PEDIATRICS

## 2019-09-10 NOTE — PROGRESS NOTES
Daily Note     Today's date: 2019  Patient name: Ludmila Nunez  : 1965  MRN: [id number]  Referring provider: Chris Mcdonough MD  Dx:   Encounter Diagnosis     ICD-10-CM    1  Shoulder impingement syndrome, right M75 41            1:1 with PTA EW for duration of treatment session  Subjective: Patient offers no new complaints today  Objective: See treatment diary below    Precautions: hypertension, allergies, history of fractures     Daily Treatment Diary   Manual   9/10    PROM   [facility]C EW EW EW EW    Sh Mobs [facility]        ACJ Mobs [facility]      ACJ Taping  [facility]                    Exercise Diary   9/10    TB Rows GTB 2x10 GTB 2 x 10 GTB 2 x 10 GTB  3 x 10 BTB  3 x 10 BTB  3 x 10    TB Low Rows GTB 2x10 GTB 2 x 10 GTB 2 x 10 GTB  3 x 10 BTB  3 x 10 BTB  3 x 10    TB ER  GTB 2x10 GTB   2 x 10 GTB 2 x 10 GTB  3 x 10 BTB  3 x 10 BTB  3 x 10    TB IR  GTB 2x10 GTB 2 x 10 GTB 2 x 10 GTB  3 x 10 BTB  3 x 10 BTB  3  x10    UT TD  10x 15 x 5" 15 x 5" 15 x 5" 5" x 15 5" x 15    Lower Trap Framing nv 15 x 5"  15 x 5" 15 x 5" OTB  5" x 15 5"  x15    Prone Rows 2#, 2x10 --        UBE retro 5' 5'  5' 5' 5' np    Shrugs  20#   3 x 10 x 5"  20#  3 x 10 20#  3 x 10 20#  3 x 10 20#  3 x 10    FR/LR/Scap  #5   1 x 15 ea 5#  2 x 10 ea 5#  2 x 10 5#  3 x 10 np-resume    Pulleys   5'  DNP  hold      Y,T,I    15x NP-resume 2 x 10    *DNP= do not perform     Assessment: Tolerated treatment fair  Pt  Demonstrated decreased motion with prone Y and T today  Increased discomfort with AROM flex  Did not perform delt raises today  Patient demonstrated fatigue post treatment and would benefit from continued PT  Plan: Progress treatment as tolerated

## 2019-09-12 ENCOUNTER — OFFICE VISIT (OUTPATIENT)
Dept: PHYSICAL THERAPY | Facility: OTHER | Age: 54
End: 2019-09-12
Payer: COMMERCIAL

## 2019-09-12 DIAGNOSIS — M75.41 SHOULDER IMPINGEMENT SYNDROME, RIGHT: Primary | ICD-10-CM

## 2019-09-12 PROCEDURE — 97140 MANUAL THERAPY 1/> REGIONS: CPT

## 2019-09-12 PROCEDURE — 97110 THERAPEUTIC EXERCISES: CPT

## 2019-09-12 PROCEDURE — 97112 NEUROMUSCULAR REEDUCATION: CPT

## 2019-09-12 NOTE — PROGRESS NOTES
Daily Note     Today's date: 2019  Patient name: Lorena Harrington  : 1965  MRN: [id number]  Referring provider: Jane Lawson MD  Dx:   Encounter Diagnosis     ICD-10-CM    1  Shoulder impingement syndrome, right M75 41            1:1 with PTA MP for duration of treatment session  Subjective: Patient states he has difficulty activating posterior shoulder muscles  He also has functional difficulty/inability with household projects such as painting  Objective: See treatment diary below    Precautions: hypertension, allergies, history of fractures     Daily Treatment Diary   Manual  8/12 8/22 8/27 8/29 9/5 9/10 9/12   PROM   GRC EW EW EW EW MP 10'   Sh Mobs [facility]        ACJ Mobs [facility]      AC Taping  [facility]                    Exercise Diary  8/12 8/22 8/27 8/29 9/5 9/10 9/12   TB Rows GTB 2x10 GTB 2 x 10 GTB 2 x 10 GTB  3 x 10 BTB  3 x 10 BTB  3 x 10 BTB 3 x 10    TB Low Rows GTB 2x10 GTB 2 x 10 GTB 2 x 10 GTB  3 x 10 BTB  3 x 10 BTB  3 x 10 BTB 3 x 10    TB ER  GTB 2x10 GTB   2 x 10 GTB 2 x 10 GTB  3 x 10 BTB  3 x 10 BTB  3 x 10 BTB 3 x 10   TB IR  GTB 2x10 GTB 2 x 10 GTB 2 x 10 GTB  3 x 10 BTB  3 x 10 BTB  3  x10 BTB 3 x 10   UT TD  10x 15 x 5" 15 x 5" 15 x 5" 5" x 15 5" x 15 5" x 15   Lower Trap Framing nv 15 x 5"  15 x 5" 15 x 5" OTB  5" x 15 5"  x15 5" x 15   Prone Rows 2#, 2x10 --        UBE retro 5' 5'  5' 5' 5' np 5'   Shrugs  20#   3 x 10 x 5"  20#  3 x 10 20#  3 x 10 20#  3 x 10 20#  3 x 10 20#   3 x 10   FR/LR/Scap  #5   1 x 15 ea 5#  2 x 10 ea 5#  2 x 10 5#  3 x 10 np-resume 5#   3 x 10    Pulleys   5'  DNP  hold      Y,T,I    15x NP-resume 2 x 10 2x10 ea  *DNP= do not perform     Assessment: Tolerated treatment well  VC to perform movements slow and thoughtfully with control  Able to activate posterior shoulder in prone with cues, however contraction is mild  Patient demonstrated fatigue post treatment and would benefit from continued PT      Plan: Progress treatment as tolerated

## 2019-09-17 ENCOUNTER — OFFICE VISIT (OUTPATIENT)
Dept: PHYSICAL THERAPY | Facility: OTHER | Age: 54
End: 2019-09-17
Payer: COMMERCIAL

## 2019-09-17 DIAGNOSIS — M75.41 SHOULDER IMPINGEMENT SYNDROME, RIGHT: Primary | ICD-10-CM

## 2019-09-17 PROCEDURE — 97112 NEUROMUSCULAR REEDUCATION: CPT | Performed by: PEDIATRICS

## 2019-09-17 PROCEDURE — 97140 MANUAL THERAPY 1/> REGIONS: CPT | Performed by: PEDIATRICS

## 2019-09-17 PROCEDURE — 97110 THERAPEUTIC EXERCISES: CPT | Performed by: PEDIATRICS

## 2019-09-17 NOTE — PROGRESS NOTES
Daily Note     Today's date: 2019  Patient name: Yasmani Lawson  : 1965  MRN: [id number]  Referring provider: Cliff Shannon MD  Dx:   Encounter Diagnosis     ICD-10-CM    1  Shoulder impingement syndrome, right M75 41            1:1 with PTA EW for duration of treatment session  Subjective: Patient states [facility] to [facility]  Objective: See treatment diary below    Precautions: hypertension, allergies, history of fractures     Daily Treatment Diary   Manual  8/22 8/27 8/29 9/5 9/10 9/12 9/17   PROM  [facility] EW EW EW EW MP 10'    Sh Mobs [facility]         ACJ Mobs [facility]       ACJ Taping [facility]                     Exercise Diary  8/22 8/27 8/29 9/5 9/10 9/12 9/17   TB Rows GTB 2 x 10 GTB 2 x 10 GTB  3 x 10 BTB  3 x 10 BTB  3 x 10 BTB 3 x 10  MTB  3 x 10   TB Low Rows GTB 2 x 10 GTB 2 x 10 GTB  3 x 10 BTB  3 x 10 BTB  3 x 10 BTB 3 x 10  MTB  3 x 10   TB ER  GTB   2 x 10 GTB 2 x 10 GTB  3 x 10 BTB  3 x 10 BTB  3 x 10 BTB 3 x 10 MTB  3 x 10   TB IR  GTB 2 x 10 GTB 2 x 10 GTB  3 x 10 BTB  3 x 10 BTB  3  x10 BTB 3 x 10 MTB  3 x 10   UT TD  15 x 5" 15 x 5" 15 x 5" 5" x 15 5" x 15 5" x 15 5" x 15   Lower Trap Framing 15 x 5"  15 x 5" 15 x 5" OTB  5" x 15 5"  x15 5" x 15 5" x 15   Prone Rows --         UBE retro 5'  5' 5' 5' np 5' 5'   Shrugs 20#   3 x 10 x 5"  20#  3 x 10 20#  3 x 10 20#  3 x 10 20#  3 x 10 20#   3 x 10 25#3 x 10   FR/LR/Scap #5   1 x 15 ea 5#  2 x 10 ea 5#  2 x 10 5#  3 x 10 np-resume 5#   3 x 10  5#  3 x 10   Pulleys  5'  DNP  hold       Y,T,I   15x NP-resume 2 x 10 2x10 ea  2 x 10   *DNP= do not perform     Assessment: Tolerated treatment well  Requires VC throughout treatment session  Continues to demonstrate challenged with R periscapular muscular activation  Patient demonstrated fatigue post treatment and would benefit from continued PT  Plan: Progress treatment as tolerated

## 2019-09-24 ENCOUNTER — OFFICE VISIT (OUTPATIENT)
Dept: PHYSICAL THERAPY | Facility: OTHER | Age: 54
End: 2019-09-24
Payer: COMMERCIAL

## 2019-09-24 DIAGNOSIS — M75.41 SHOULDER IMPINGEMENT SYNDROME, RIGHT: Primary | ICD-10-CM

## 2019-09-24 PROCEDURE — 97140 MANUAL THERAPY 1/> REGIONS: CPT | Performed by: PEDIATRICS

## 2019-09-24 PROCEDURE — 97110 THERAPEUTIC EXERCISES: CPT | Performed by: PEDIATRICS

## 2019-09-24 NOTE — PROGRESS NOTES
Daily Note     Today's date: 2019  Patient name: Naomi Grande  : 1965  MRN: [id number]  Referring provider: Max Strickland MD  Dx:   Encounter Diagnosis     ICD-10-CM    1  Shoulder impingement syndrome, right M75 41         7920-7083 IEP  9150-0605 1:1 with PTA EW  1409-7625 CP            Subjective: Patient states he was laying on his back wrenching his motor home over the weekend so his neck and shoulder are sore  Objective: See treatment diary below    Precautions: hypertension, allergies, history of fractures     Daily Treatment Diary   Manual   9 9/10 9/12 9/17 9/24   PROM  [facility] EW EW EW EW MP 10'  EW   Sh Mobs [facility]          ACJ Mobs [facility]        ACJ Taping [facility]                       Exercise Diary   910    TB Rows GTB 2 x 10 GTB 2 x 10 GTB  3 x 10 BTB  3 x 10 BTB  3 x 10 BTB 3 x 10  MTB  3 x 10 MTB  3 x 10   TB Low Rows GTB 2 x 10 GTB 2 x 10 GTB  3 x 10 BTB  3 x 10 BTB  3 x 10 BTB 3 x 10  MTB  3 x 10 MTB  3 x 10   TB ER  GTB   2 x 10 GTB 2 x 10 GTB  3 x 10 BTB  3 x 10 BTB  3 x 10 BTB 3 x 10 MTB  3 x 10 MTB  3 x 10   TB IR  GTB 2 x 10 GTB 2 x 10 GTB  3 x 10 BTB  3 x 10 BTB  3  x10 BTB 3 x 10 MTB  3 x 10 MTB  3 x 10   UT TD  15 x 5" 15 x 5" 15 x 5" 5" x 15 5" x 15 5" x 15 5" x 15 5" x 15   Lower Trap Framing 15 x 5"  15 x 5" 15 x 5" OTB  5" x 15 5"  x15 5" x 15 5" x 15 5" x 15   Prone Rows --          UBE retro 5'  5' 5' 5' np 5' 5' 5'   Shrugs 20#   3 x 10 x 5"  20#  3 x 10 20#  3 x 10 20#  3 x 10 20#  3 x 10 20#   3 x 10 25#3 x 10 25#  3 x 10   FR/LR/Scap #5   1 x 15 ea 5#  2 x 10 ea 5#  2 x 10 5#  3 x 10 np-resume 5#   3 x 10  5#  3 x 10 5#  3 x 10   Pulleys  5'  DNP  hold        Y,T,I   15x NP-resume 2 x 10 2x10 ea  2 x 10 2 x 10   *DNP= do not perform     Assessment: Tolerated treatment well  Improved ROM with Prone Y,T,I  Cuing to correct technique required thorughout treatment session   Continues to demonstrate pretty significant forward neck and shoulder posture  It doesn't appear that patient limits use or allows time for shoulder to rest  Patient inquired about painting entire side of shed the day before seeing the MD Jearlean Rinne patient not to do this since it seems like increased activity increases symptoms  Patient demonstrated fatigue post treatment and would benefit from continued PT  Plan: Progress treatment as tolerated

## 2019-09-26 ENCOUNTER — OFFICE VISIT (OUTPATIENT)
Dept: PHYSICAL THERAPY | Facility: OTHER | Age: 54
End: 2019-09-26
Payer: COMMERCIAL

## 2019-09-26 DIAGNOSIS — M75.41 SHOULDER IMPINGEMENT SYNDROME, RIGHT: Primary | ICD-10-CM

## 2019-09-26 PROCEDURE — 97110 THERAPEUTIC EXERCISES: CPT | Performed by: PEDIATRICS

## 2019-09-26 PROCEDURE — 97112 NEUROMUSCULAR REEDUCATION: CPT | Performed by: PEDIATRICS

## 2019-09-26 PROCEDURE — 97140 MANUAL THERAPY 1/> REGIONS: CPT | Performed by: PHYSICAL THERAPIST

## 2019-09-26 NOTE — PROGRESS NOTES
Daily Note     Today's date: 2019  Patient name: Campbell Luo  : 1965  MRN: [id number]  Referring provider: Jonnathan Morris MD  Dx:   Encounter Diagnosis     ICD-10-CM    1  Shoulder impingement syndrome, right M75 41        1:1 with PTA EW 4085-0437  IEP 9601-1960  1:1 with PTA Arevalo Shelbyire 4655-9614            Subjective: Patient reports no changes at this time  Objective: See treatment diary below    Precautions: hypertension, allergies, history of fractures     Daily Treatment Diary   Manual  8/29 9/5 9/10 9/12 9/17 9/24 9/26   PROM  EW EW EW MP 10'  EW EW   Sh Mobs          ACJ Mobs [facility]         ACJ Taping                      Exercise Diary  8/29 9/5 9/10 9/12 9/17 9/24 9/26   TB Rows GTB  3 x 10 BTB  3 x 10 BTB  3 x 10 BTB 3 x 10  MTB  3 x 10 MTB  3 x 10 MTB  3 x 10   TB Low Rows GTB  3 x 10 BTB  3 x 10 BTB  3 x 10 BTB 3 x 10  MTB  3 x 10 MTB  3 x 10 MTB  3 x 10   TB ER  GTB  3 x 10 BTB  3 x 10 BTB  3 x 10 BTB 3 x 10 MTB  3 x 10 MTB  3 x 10 MTB  3 x 10   TB IR  GTB  3 x 10 BTB  3 x 10 BTB  3  x10 BTB 3 x 10 MTB  3 x 10 MTB  3 x 10 MTB  3 x 10   UT TD  15 x 5" 5" x 15 5" x 15 5" x 15 5" x 15 5" x 15 5" x 15   Lower Trap Framing 15 x 5" OTB  5" x 15 5"  x15 5" x 15 5" x 15 5" x 15 5" x 15   Prone Rows          UBE retro 5' 5' np 5' 5' 5' 5'   Shrugs 20#  3 x 10 20#  3 x 10 20#  3 x 10 20#   3 x 10 25#3 x 10 25#  3 x 10 25#  3 x 10   FR/LR/Scap 5#  2 x 10 5#  3 x 10 np-resume 5#   3 x 10  5#  3 x 10 5#  3 x 10 5#  3 x 10   Pulleys  hold         Y,T,I 15x NP-resume 2 x 10 2x10 ea  2 x 10 2 x 10 2 x 10   *DNP= do not perform     Assessment: Tolerated treatment well  Continues to require cuing for technique throughout treatment session  Improved PROM ER noted today  No "clicking" in shoulder today  Patient demonstrated fatigue post treatment and would benefit from continued PT  Plan: Progress treatment as tolerated

## 2019-10-03 ENCOUNTER — OFFICE VISIT (OUTPATIENT)
Dept: OBGYN CLINIC | Facility: OTHER | Age: 54
End: 2019-10-03
Payer: COMMERCIAL

## 2019-10-03 VITALS
SYSTOLIC BLOOD PRESSURE: 170 MMHG | WEIGHT: 247 LBS | HEIGHT: 71 IN | BODY MASS INDEX: 34.58 KG/M2 | DIASTOLIC BLOOD PRESSURE: 96 MMHG | HEART RATE: 75 BPM

## 2019-10-03 DIAGNOSIS — M75.41 IMPINGEMENT SYNDROME OF RIGHT SHOULDER: Primary | ICD-10-CM

## 2019-10-03 PROCEDURE — 99213 OFFICE O/P EST LOW 20 MIN: CPT | Performed by: PHYSICIAN ASSISTANT

## 2019-10-03 NOTE — PROGRESS NOTES
Assessment  Diagnoses and all orders for this visit:    Impingement syndrome of right shoulder      Discussion and Plan:    Continue with home exercise program  Activities to tolerance  Follow up as needed  If patient plateaus or is not happy with progress, he will contact office for reevaluation  He is optimistic and does not feel there is anything structurally wrong with his shoulder  All questions answered    Subjective:   Patient ID: Iliana Denis is a 53 y o  male      Kimmy Kemp returns to the office in follow up of the right shoulder  He notes improvement in the right shoulder since last office visit  He is not 100% but is improving  He has pain after certain activities such as overhead work nor working on cars  Localizes pain to the trapezius  Pain in the right shoulder started after accident playing hockey and describes a dead arm sensation  Denies any numbness or tingling  Admits to mild neck discomfort  He denies new injury or trauma  /96 (BP Location: Left arm, Patient Position: Sitting, Cuff Size: Standard)   Pulse 75   Ht 5' 11" (1 803 m)   Wt 112 kg (247 lb)   BMI 34 45 kg/m²     The following portions of the patient's history were reviewed and updated as appropriate: allergies, current medications, past family history, past medical history, past social history, past surgical history and problem list     Review of Systems   Constitutional: Negative for chills and fever  HENT: Negative for hearing loss  Eyes: Negative for visual disturbance  Respiratory: Negative for shortness of breath  Cardiovascular: Negative for chest pain  Gastrointestinal: Negative for abdominal pain  Musculoskeletal:        As reviewed in the HPI   Skin: Negative for rash  Neurological:        As reviewed in the HPI   Psychiatric/Behavioral: Negative for agitation  All other systems reviewed and are negative        Objective:  Right Shoulder Exam     Tenderness   Right shoulder tenderness location: trapezius  Range of Motion   The patient has normal right shoulder ROM  Muscle Strength   External rotation: 5/5   Supraspinatus: 4/5   Subscapularis: 5/5     Tests   Garcia test: negative  Impingement: negative  Drop arm: negative    Other   Erythema: absent  Sensation: normal  Pulse: present    Comments:  Negative speeds    Positive Amador            Physical Exam   Constitutional: He is oriented to person, place, and time  He appears well-developed and well-nourished  HENT:   Head: Normocephalic  Eyes: EOM are normal    Neck: Normal range of motion  Pulmonary/Chest: No respiratory distress  He has no wheezes  Neurological: He is alert and oriented to person, place, and time  Skin: Skin is warm and dry  Psychiatric: He has a normal mood and affect   His behavior is normal  Judgment and thought content normal

## 2020-06-11 ENCOUNTER — AMB VIDEO VISIT (OUTPATIENT)
Dept: URGENT CARE | Facility: CLINIC | Age: 55
End: 2020-06-11

## 2020-06-11 DIAGNOSIS — L25.5 CONTACT DERMATITIS DUE TO PLANT: Primary | ICD-10-CM

## 2020-06-11 RX ORDER — PREDNISONE 10 MG/1
TABLET ORAL
Qty: 26 TABLET | Refills: 0 | Status: SHIPPED | OUTPATIENT
Start: 2020-06-11 | End: 2020-06-26 | Stop reason: ALTCHOICE

## 2020-06-26 ENCOUNTER — OFFICE VISIT (OUTPATIENT)
Dept: INTERNAL MEDICINE CLINIC | Facility: CLINIC | Age: 55
End: 2020-06-26
Payer: COMMERCIAL

## 2020-06-26 VITALS
BODY MASS INDEX: 34.67 KG/M2 | SYSTOLIC BLOOD PRESSURE: 150 MMHG | OXYGEN SATURATION: 98 % | HEIGHT: 70 IN | DIASTOLIC BLOOD PRESSURE: 96 MMHG | WEIGHT: 242.2 LBS | TEMPERATURE: 97.9 F | HEART RATE: 71 BPM

## 2020-06-26 DIAGNOSIS — E78.2 MIXED HYPERLIPIDEMIA: ICD-10-CM

## 2020-06-26 DIAGNOSIS — I10 ESSENTIAL HYPERTENSION: ICD-10-CM

## 2020-06-26 DIAGNOSIS — Z12.11 ENCOUNTER FOR SCREENING FOR MALIGNANT NEOPLASM OF COLON: Primary | ICD-10-CM

## 2020-06-26 DIAGNOSIS — E66.9 OBESITY (BMI 30.0-34.9): ICD-10-CM

## 2020-06-26 DIAGNOSIS — Z11.59 NEED FOR HEPATITIS C SCREENING TEST: ICD-10-CM

## 2020-06-26 PROBLEM — E78.5 HYPERLIPIDEMIA: Status: ACTIVE | Noted: 2018-06-18

## 2020-06-26 PROBLEM — E66.811 OBESITY (BMI 30.0-34.9): Status: ACTIVE | Noted: 2020-06-26

## 2020-06-26 PROBLEM — M75.41 IMPINGEMENT SYNDROME OF RIGHT SHOULDER: Status: RESOLVED | Noted: 2019-10-03 | Resolved: 2020-06-26

## 2020-06-26 PROCEDURE — 3077F SYST BP >= 140 MM HG: CPT | Performed by: INTERNAL MEDICINE

## 2020-06-26 PROCEDURE — 99203 OFFICE O/P NEW LOW 30 MIN: CPT | Performed by: INTERNAL MEDICINE

## 2020-06-26 PROCEDURE — 1036F TOBACCO NON-USER: CPT | Performed by: INTERNAL MEDICINE

## 2020-06-26 PROCEDURE — 3080F DIAST BP >= 90 MM HG: CPT | Performed by: INTERNAL MEDICINE

## 2020-06-26 PROCEDURE — 3008F BODY MASS INDEX DOCD: CPT | Performed by: INTERNAL MEDICINE

## 2020-06-26 RX ORDER — LISINOPRIL 10 MG/1
10 TABLET ORAL DAILY
Qty: 90 TABLET | Refills: 1 | Status: CANCELLED | OUTPATIENT
Start: 2020-06-26

## 2020-06-26 RX ORDER — AMLODIPINE BESYLATE 5 MG/1
5 TABLET ORAL DAILY
Qty: 30 TABLET | Refills: 5 | Status: SHIPPED | OUTPATIENT
Start: 2020-06-26 | End: 2020-08-10 | Stop reason: SDUPTHER

## 2020-06-26 RX ORDER — LISINOPRIL AND HYDROCHLOROTHIAZIDE 12.5; 1 MG/1; MG/1
1 TABLET ORAL DAILY
Qty: 30 TABLET | Refills: 5 | Status: SHIPPED | OUTPATIENT
Start: 2020-06-26 | End: 2020-08-10 | Stop reason: SDUPTHER

## 2020-06-29 ENCOUNTER — TELEPHONE (OUTPATIENT)
Dept: ADMINISTRATIVE | Facility: OTHER | Age: 55
End: 2020-06-29

## 2020-06-29 NOTE — TELEPHONE ENCOUNTER
Upon review of the In Basket request and the patient's chart, initial outreach has been made via fax, please see Contacts section for details  A second outreach attempt will be made within 5 business days      Thank you  Linda Orantes MA

## 2020-06-29 NOTE — TELEPHONE ENCOUNTER
----- Message from Nj Whipple sent at 6/26/2020  5:54 PM EDT -----  Regarding: colonoscopy - [facility]  Contact: [phone]  06/26/20 5:55 PM    Hello, our patient Shayy Malhotra has had CRC: Colonoscopy completed/performed  Please assist in updating the patient chart by making an External outreach to Jere Maxwell DO at [facility] facility located in [place], [place]   The date of service is 07/16/2014      Thank you,  jN Whipple   Mickey Childs

## 2020-06-29 NOTE — LETTER
Procedure Request Form: Colonoscopy      Date Requested: 07/10/20  Patient: Felicia Cruise  Patient : 1965   Referring Provider: Steven Patient, DO        Date of Procedure ______________________________       The above patient has informed us that they have completed their   most recent Colonoscopy at your facility  Please complete   this form and attach all corresponding procedure reports/results  Comments __________________________________________________________  ____________________________________________________________________  ____________________________________________________________________  ____________________________________________________________________    Facility Completing Procedure _________________________________________    Form Completed By (print name) _______________________________________      Signature __________________________________________________________      These reports are needed for  compliance    Please fax this completed form and a copy of the procedure report to our office located at [place] [address] as soon as possible to [phone] attention Rakel Malloy: Phone [phone]    We thank you for your assistance in treating our mutual patient

## 2020-06-29 NOTE — LETTER
Procedure Request Form: Colonoscopy      Date Requested: 20  Patient: Geo Salas  Patient : 1965   Referring Provider: Lb Brady, DO        Date of Procedure ______________________________       The above patient has informed us that they have completed their   most recent Colonoscopy at your facility  Please complete   this form and attach all corresponding procedure reports/results  Comments __________________________________________________________  ____________________________________________________________________  ____________________________________________________________________  ____________________________________________________________________    Facility Completing Procedure _________________________________________    Form Completed By (print name) _______________________________________      Signature __________________________________________________________      These reports are needed for  compliance    Please fax this completed form and a copy of the procedure report to our office located at [place] [address] as soon as possible to [phone] attention Ricardo Reaves: Phone [phone]    We thank you for your assistance in treating our mutual patient

## 2020-07-10 NOTE — TELEPHONE ENCOUNTER
As a follow-up, a second attempt has been made for outreach via fax, please see Contacts section for details  A third and final attempt will be made within 5 business days      Thank you  Barbara Skinner MA

## 2020-07-17 NOTE — TELEPHONE ENCOUNTER
Upon review of the In Basket request we were able to locate, review, and update the patient chart as requested for CRC: Colonoscopy  Any additional questions or concerns should be emailed to the Practice Liaisons via [email or web address] email, please do not reply via In Basket      Thank you  Barbara Skinner MA

## 2020-07-31 LAB
ALBUMIN SERPL-MCNC: 4.1 G/DL (ref 3.6–5.1)
ALBUMIN/GLOB SERPL: 1.5 (CALC) (ref 1–2.5)
ALP SERPL-CCNC: 59 U/L (ref 35–144)
ALT SERPL-CCNC: 20 U/L (ref 9–46)
APPEARANCE UR: CLEAR
AST SERPL-CCNC: 19 U/L (ref 10–35)
BASOPHILS # BLD AUTO: 49 CELLS/UL (ref 0–200)
BASOPHILS NFR BLD AUTO: 0.6 %
BILIRUB SERPL-MCNC: 0.5 MG/DL (ref 0.2–1.2)
BILIRUB UR QL STRIP: NEGATIVE
BUN SERPL-MCNC: 13 MG/DL (ref 7–25)
BUN/CREAT SERPL: ABNORMAL (CALC) (ref 6–22)
CALCIUM SERPL-MCNC: 8.7 MG/DL (ref 8.6–10.3)
CHLORIDE SERPL-SCNC: 99 MMOL/L (ref 98–110)
CHOLEST SERPL-MCNC: 184 MG/DL
CHOLEST/HDLC SERPL: 5.8 (CALC)
CO2 SERPL-SCNC: 30 MMOL/L (ref 20–32)
COLOR UR: YELLOW
CREAT SERPL-MCNC: 0.95 MG/DL (ref 0.7–1.33)
EOSINOPHIL # BLD AUTO: 275 CELLS/UL (ref 15–500)
EOSINOPHIL NFR BLD AUTO: 3.4 %
ERYTHROCYTE [DISTWIDTH] IN BLOOD BY AUTOMATED COUNT: 13.2 % (ref 11–15)
EST. AVERAGE GLUCOSE BLD GHB EST-MCNC: 114 (CALC)
EST. AVERAGE GLUCOSE BLD GHB EST-SCNC: 6.3 (CALC)
GLOBULIN SER CALC-MCNC: 2.7 G/DL (CALC) (ref 1.9–3.7)
GLUCOSE SERPL-MCNC: 102 MG/DL (ref 65–99)
GLUCOSE UR QL STRIP: NEGATIVE
HBA1C MFR BLD: 5.6 % OF TOTAL HGB
HCT VFR BLD AUTO: 41 % (ref 38.5–50)
HCV AB S/CO SERPL IA: 0.01
HCV AB SERPL QL IA: NORMAL
HDLC SERPL-MCNC: 32 MG/DL
HGB BLD-MCNC: 14.1 G/DL (ref 13.2–17.1)
HGB UR QL STRIP: NEGATIVE
KETONES UR QL STRIP: NEGATIVE
LDLC SERPL CALC-MCNC: 128 MG/DL (CALC)
LEUKOCYTE ESTERASE UR QL STRIP: NEGATIVE
LYMPHOCYTES # BLD AUTO: 1936 CELLS/UL (ref 850–3900)
LYMPHOCYTES NFR BLD AUTO: 23.9 %
MCH RBC QN AUTO: 30.4 PG (ref 27–33)
MCHC RBC AUTO-ENTMCNC: 34.4 G/DL (ref 32–36)
MCV RBC AUTO: 88.4 FL (ref 80–100)
MONOCYTES # BLD AUTO: 786 CELLS/UL (ref 200–950)
MONOCYTES NFR BLD AUTO: 9.7 %
NEUTROPHILS # BLD AUTO: 5054 CELLS/UL (ref 1500–7800)
NEUTROPHILS NFR BLD AUTO: 62.4 %
NITRITE UR QL STRIP: NEGATIVE
NONHDLC SERPL-MCNC: 152 MG/DL (CALC)
PH UR STRIP: 5.5 [PH] (ref 5–8)
PLATELET # BLD AUTO: 210 THOUSAND/UL (ref 140–400)
PMV BLD REES-ECKER: 10.4 FL (ref 7.5–12.5)
POTASSIUM SERPL-SCNC: 3.8 MMOL/L (ref 3.5–5.3)
PROT SERPL-MCNC: 6.8 G/DL (ref 6.1–8.1)
PROT UR QL STRIP: NEGATIVE
RBC # BLD AUTO: 4.64 MILLION/UL (ref 4.2–5.8)
SL AMB EGFR AFRICAN AMERICAN: 103 ML/MIN/1.73M2
SL AMB EGFR NON AFRICAN AMERICAN: 89 ML/MIN/1.73M2
SODIUM SERPL-SCNC: 135 MMOL/L (ref 135–146)
SP GR UR STRIP: 1.02 (ref 1–1.03)
TRIGL SERPL-MCNC: 128 MG/DL
WBC # BLD AUTO: 8.1 THOUSAND/UL (ref 3.8–10.8)

## 2020-08-10 ENCOUNTER — OFFICE VISIT (OUTPATIENT)
Dept: INTERNAL MEDICINE CLINIC | Facility: CLINIC | Age: 55
End: 2020-08-10
Payer: COMMERCIAL

## 2020-08-10 VITALS
HEART RATE: 80 BPM | HEIGHT: 71 IN | TEMPERATURE: 99.4 F | OXYGEN SATURATION: 98 % | BODY MASS INDEX: 34.47 KG/M2 | SYSTOLIC BLOOD PRESSURE: 118 MMHG | WEIGHT: 246.2 LBS | DIASTOLIC BLOOD PRESSURE: 80 MMHG

## 2020-08-10 DIAGNOSIS — N52.9 ERECTILE DYSFUNCTION, UNSPECIFIED ERECTILE DYSFUNCTION TYPE: ICD-10-CM

## 2020-08-10 DIAGNOSIS — E78.2 MIXED HYPERLIPIDEMIA: Primary | ICD-10-CM

## 2020-08-10 DIAGNOSIS — I10 ESSENTIAL HYPERTENSION: ICD-10-CM

## 2020-08-10 PROCEDURE — 3725F SCREEN DEPRESSION PERFORMED: CPT | Performed by: INTERNAL MEDICINE

## 2020-08-10 PROCEDURE — 99213 OFFICE O/P EST LOW 20 MIN: CPT | Performed by: INTERNAL MEDICINE

## 2020-08-10 PROCEDURE — 3079F DIAST BP 80-89 MM HG: CPT | Performed by: INTERNAL MEDICINE

## 2020-08-10 PROCEDURE — 3074F SYST BP LT 130 MM HG: CPT | Performed by: INTERNAL MEDICINE

## 2020-08-10 PROCEDURE — 3008F BODY MASS INDEX DOCD: CPT | Performed by: INTERNAL MEDICINE

## 2020-08-10 PROCEDURE — 1036F TOBACCO NON-USER: CPT | Performed by: INTERNAL MEDICINE

## 2020-08-10 RX ORDER — SILDENAFIL 100 MG/1
100 TABLET, FILM COATED ORAL DAILY PRN
Qty: 10 TABLET | Refills: 0 | Status: SHIPPED | OUTPATIENT
Start: 2020-08-10 | End: 2020-12-07

## 2020-08-10 RX ORDER — AMLODIPINE BESYLATE 5 MG/1
5 TABLET ORAL DAILY
Qty: 90 TABLET | Refills: 1 | Status: SHIPPED | OUTPATIENT
Start: 2020-08-10 | End: 2021-03-05

## 2020-08-10 RX ORDER — LISINOPRIL AND HYDROCHLOROTHIAZIDE 12.5; 1 MG/1; MG/1
1 TABLET ORAL DAILY
Qty: 90 TABLET | Refills: 1 | Status: SHIPPED | OUTPATIENT
Start: 2020-08-10 | End: 2021-03-05

## 2020-08-10 NOTE — PROGRESS NOTES
Assessment/Plan:    Erectile dysfunction  Patient was given a trial prescription of sildenafil 100 mg to take 1/2 to 1 tablet as needed    Essential hypertension  Blood pressure is nicely controlled at this time after medication changes  Patient reports no significant side effects  Hyperlipidemia  Mixed hyperlipidemia with elevated LDL, low HDL and goal levels of total cholesterol and normal triglycerides  Diagnoses and all orders for this visit:    Mixed hyperlipidemia  -     sildenafil (VIAGRA) 100 mg tablet; Take 1 tablet (100 mg total) by mouth daily as needed for erectile dysfunction  -     Comprehensive metabolic panel; Future  -     CBC and Platelet; Future    Essential hypertension  -     amLODIPine (NORVASC) 5 mg tablet; Take 1 tablet (5 mg total) by mouth daily  -     lisinopril-hydrochlorothiazide (Zestoretic) 10-12 5 MG per tablet; Take 1 tablet by mouth daily  -     sildenafil (VIAGRA) 100 mg tablet; Take 1 tablet (100 mg total) by mouth daily as needed for erectile dysfunction  -     Comprehensive metabolic panel; Future  -     CBC and Platelet; Future    Erectile dysfunction, unspecified erectile dysfunction type  -     sildenafil (VIAGRA) 100 mg tablet; Take 1 tablet (100 mg total) by mouth daily as needed for erectile dysfunction          Subjective:      Patient ID: Ana Javier is a 63 y o  male  Patient presents to the office for follow-up visit for hypertension  Low-dose HCTZ was added along with amlodipine with his significant improvement in blood pressure readings  Patient reports no significant issues or side effects with medications  He does report longstanding erectile dysfunction for which he has not been evaluated        Family History   Problem Relation Age of Onset    Heart disease Mother     Heart disease Father     Stroke Father     Aortic stenosis Brother         Aortic valve replacement, carotid stenosis     Social History     Socioeconomic History    Marital status: /Civil Union     Spouse name: Not on file    Number of children: Not on file    Years of education: Not on file    Highest education level: Not on file   Occupational History    Not on file   Social Needs    Financial resource strain: Not on file    Food insecurity     Worry: Not on file     Inability: Not on file    Transportation needs     Medical: Not on file     Non-medical: Not on file   Tobacco Use    Smoking status: Former Smoker     Types: Cigarettes     Last attempt to quit: 2013     Years since quittin 6    Smokeless tobacco: Former User     Types: Chew    Tobacco comment: 1 cigarette per week for approximately 10 years   Substance and Sexual Activity    Alcohol use: Yes     Comment: social    Drug use: Never    Sexual activity: Not on file   Lifestyle    Physical activity     Days per week: Not on file     Minutes per session: Not on file    Stress: Not on file   Relationships    Social connections     Talks on phone: Not on file     Gets together: Not on file     Attends [employer] service: Not on file     Active member of club or organization: Not on file     Attends meetings of clubs or organizations: Not on file     Relationship status: Not on file    Intimate partner violence     Fear of current or ex partner: Not on file     Emotionally abused: Not on file     Physically abused: Not on file     Forced sexual activity: Not on file   Other Topics Concern    Not on file   Social History Narrative    Not on file     Past Medical History:   Diagnosis Date    GERD (gastroesophageal reflux disease)     Hypertension     Impingement syndrome of right shoulder 10/3/2019    Seasonal allergies        Current Outpatient Medications:     amLODIPine (NORVASC) 5 mg tablet, Take 1 tablet (5 mg total) by mouth daily, Disp: 90 tablet, Rfl: 1    fexofenadine (ALLEGRA) 180 MG tablet, Take 90 mg by mouth daily, Disp: , Rfl:     lisinopril-hydrochlorothiazide (Zestoretic) 10-12 5 MG per tablet, Take 1 tablet by mouth daily, Disp: 90 tablet, Rfl: 1    sildenafil (VIAGRA) 100 mg tablet, Take 1 tablet (100 mg total) by mouth daily as needed for erectile dysfunction, Disp: 10 tablet, Rfl: 0  Allergies   Allergen Reactions    Pollen Extract      Plants     Past Surgical History:   Procedure Laterality Date    BACK SURGERY  2005    Laminectomy    KNEE SURGERY Right 1994         Review of Systems   Constitutional: Negative  HENT: Negative  Eyes: Negative  Respiratory: Positive for cough (Occasional, mild nonproductive cough)  Cardiovascular: Positive for chest pain  Gastrointestinal: Negative  Genitourinary: Negative  Musculoskeletal: Negative  Allergic/Immunologic: Positive for environmental allergies  Neurological: Negative  Hematological: Negative  Psychiatric/Behavioral: Negative  Objective:      /80 (BP Location: Left arm, Patient Position: Sitting, Cuff Size: Large)   Pulse 80   Temp 99 4 °F (37 4 °C) (Temporal)   Ht 5' 11 34" (1 812 m) Comment: with shoes  Wt 112 kg (246 lb 3 2 oz) Comment: with shoes  SpO2 98%   BMI 34 01 kg/m²          Physical Exam  Vitals signs reviewed  Constitutional:       Appearance: Normal appearance  He is obese  He is not ill-appearing or diaphoretic  HENT:      Head: Normocephalic and atraumatic  Right Ear: External ear normal       Left Ear: External ear normal       Nose: Nose normal    Eyes:      General: No scleral icterus  Conjunctiva/sclera: Conjunctivae normal       Pupils: Pupils are equal, round, and reactive to light  Neck:      Musculoskeletal: Neck supple  Cardiovascular:      Rate and Rhythm: Bradycardia present  Rhythm irregular  Pulses: Normal pulses  Heart sounds: Normal heart sounds  Pulmonary:      Effort: Pulmonary effort is normal       Breath sounds: Normal breath sounds  No wheezing, rhonchi or rales     Abdominal:      General: Abdomen is flat  There is no distension  Palpations: Abdomen is soft  Musculoskeletal:      Right lower leg: No edema  Left lower leg: No edema  Lymphadenopathy:      Cervical: No cervical adenopathy  Skin:     General: Skin is dry  Capillary Refill: Capillary refill takes less than 2 seconds  Coloration: Skin is not jaundiced  Findings: No rash  Neurological:      General: No focal deficit present  Mental Status: He is alert and oriented to person, place, and time  Mental status is at baseline  Psychiatric:         Mood and Affect: Mood normal          Behavior: Behavior normal          Thought Content:  Thought content normal          Judgment: Judgment normal

## 2020-08-10 NOTE — ASSESSMENT & PLAN NOTE
Blood pressure is nicely controlled at this time after medication changes  Patient reports no significant side effects

## 2020-08-10 NOTE — ASSESSMENT & PLAN NOTE
Mixed hyperlipidemia with elevated LDL, low HDL and goal levels of total cholesterol and normal triglycerides

## 2020-12-07 DIAGNOSIS — I10 ESSENTIAL HYPERTENSION: ICD-10-CM

## 2020-12-07 DIAGNOSIS — N52.9 ERECTILE DYSFUNCTION, UNSPECIFIED ERECTILE DYSFUNCTION TYPE: ICD-10-CM

## 2020-12-07 DIAGNOSIS — E78.2 MIXED HYPERLIPIDEMIA: ICD-10-CM

## 2020-12-07 RX ORDER — SILDENAFIL 100 MG/1
TABLET, FILM COATED ORAL
Qty: 10 TABLET | Refills: 0 | Status: SHIPPED | OUTPATIENT
Start: 2020-12-07 | End: 2021-07-16

## 2021-01-22 DIAGNOSIS — Z23 ENCOUNTER FOR IMMUNIZATION: ICD-10-CM

## 2021-03-05 DIAGNOSIS — I10 ESSENTIAL HYPERTENSION: ICD-10-CM

## 2021-03-05 RX ORDER — LISINOPRIL AND HYDROCHLOROTHIAZIDE 12.5; 1 MG/1; MG/1
TABLET ORAL
Qty: 90 TABLET | Refills: 0 | Status: SHIPPED | OUTPATIENT
Start: 2021-03-05 | End: 2021-05-27 | Stop reason: SDUPTHER

## 2021-03-05 RX ORDER — AMLODIPINE BESYLATE 5 MG/1
TABLET ORAL
Qty: 90 TABLET | Refills: 0 | Status: SHIPPED | OUTPATIENT
Start: 2021-03-05 | End: 2021-05-27 | Stop reason: SDUPTHER

## 2021-05-19 ENCOUNTER — APPOINTMENT (OUTPATIENT)
Dept: LAB | Age: 56
End: 2021-05-19

## 2021-05-19 ENCOUNTER — TRANSCRIBE ORDERS (OUTPATIENT)
Dept: ADMINISTRATIVE | Age: 56
End: 2021-05-19

## 2021-05-19 DIAGNOSIS — Z00.8 HEALTH EXAMINATION IN POPULATION SURVEY: ICD-10-CM

## 2021-05-19 DIAGNOSIS — Z00.8 HEALTH EXAMINATION IN POPULATION SURVEY: Primary | ICD-10-CM

## 2021-05-19 LAB
ALBUMIN SERPL-MCNC: 4.5 G/DL (ref 3.6–5.1)
ALBUMIN/GLOB SERPL: 1.5 (CALC) (ref 1–2.5)
ALP SERPL-CCNC: 68 U/L (ref 35–144)
ALT SERPL-CCNC: 24 U/L (ref 9–46)
AST SERPL-CCNC: 21 U/L (ref 10–35)
BILIRUB SERPL-MCNC: 0.7 MG/DL (ref 0.2–1.2)
BUN SERPL-MCNC: 17 MG/DL (ref 7–25)
BUN/CREAT SERPL: NORMAL (CALC) (ref 6–22)
CALCIUM SERPL-MCNC: 9.3 MG/DL (ref 8.6–10.3)
CHLORIDE SERPL-SCNC: 99 MMOL/L (ref 98–110)
CHOLEST SERPL-MCNC: 183 MG/DL (ref 50–200)
CO2 SERPL-SCNC: 32 MMOL/L (ref 20–32)
CREAT SERPL-MCNC: 0.91 MG/DL (ref 0.7–1.33)
ERYTHROCYTE [DISTWIDTH] IN BLOOD BY AUTOMATED COUNT: 13.4 % (ref 11–15)
EST. AVERAGE GLUCOSE BLD GHB EST-MCNC: 108 MG/DL
GLOBULIN SER CALC-MCNC: 3.1 G/DL (CALC) (ref 1.9–3.7)
GLUCOSE SERPL-MCNC: 97 MG/DL (ref 65–99)
HBA1C MFR BLD: 5.4 %
HCT VFR BLD AUTO: 46.8 % (ref 38.5–50)
HDLC SERPL-MCNC: 32 MG/DL
HGB BLD-MCNC: 16.3 G/DL (ref 13.2–17.1)
LDLC SERPL CALC-MCNC: 123 MG/DL (ref 0–100)
MCH RBC QN AUTO: 30.8 PG (ref 27–33)
MCHC RBC AUTO-ENTMCNC: 34.8 G/DL (ref 32–36)
MCV RBC AUTO: 88.3 FL (ref 80–100)
NONHDLC SERPL-MCNC: 151 MG/DL
PLATELET # BLD AUTO: 263 THOUSAND/UL (ref 140–400)
PMV BLD REES-ECKER: 10.3 FL (ref 7.5–12.5)
POTASSIUM SERPL-SCNC: 4.5 MMOL/L (ref 3.5–5.3)
PROT SERPL-MCNC: 7.6 G/DL (ref 6.1–8.1)
RBC # BLD AUTO: 5.3 MILLION/UL (ref 4.2–5.8)
SL AMB EGFR AFRICAN AMERICAN: 109 ML/MIN/1.73M2
SL AMB EGFR NON AFRICAN AMERICAN: 94 ML/MIN/1.73M2
SODIUM SERPL-SCNC: 137 MMOL/L (ref 135–146)
TRIGL SERPL-MCNC: 138 MG/DL
WBC # BLD AUTO: 8.7 THOUSAND/UL (ref 3.8–10.8)

## 2021-05-19 PROCEDURE — 83036 HEMOGLOBIN GLYCOSYLATED A1C: CPT

## 2021-05-19 PROCEDURE — 36415 COLL VENOUS BLD VENIPUNCTURE: CPT

## 2021-05-19 PROCEDURE — 80061 LIPID PANEL: CPT

## 2021-05-21 ENCOUNTER — RA CDI HCC (OUTPATIENT)
Dept: OTHER | Facility: HOSPITAL | Age: 56
End: 2021-05-21

## 2021-05-21 NOTE — PROGRESS NOTES
[facility]  coding opportunities          Chart reviewed, no opportunity found: CHART REVIEWED, NO OPPORTUNITY FOUND              Patients insurance company: [employer] (Medicare Advantage and Commercial)

## 2021-05-27 ENCOUNTER — TELEPHONE (OUTPATIENT)
Dept: INTERNAL MEDICINE CLINIC | Facility: CLINIC | Age: 56
End: 2021-05-27

## 2021-05-27 ENCOUNTER — OFFICE VISIT (OUTPATIENT)
Dept: INTERNAL MEDICINE CLINIC | Facility: CLINIC | Age: 56
End: 2021-05-27
Payer: COMMERCIAL

## 2021-05-27 VITALS
HEART RATE: 95 BPM | SYSTOLIC BLOOD PRESSURE: 114 MMHG | OXYGEN SATURATION: 98 % | BODY MASS INDEX: 33.82 KG/M2 | WEIGHT: 241.6 LBS | DIASTOLIC BLOOD PRESSURE: 78 MMHG | TEMPERATURE: 98.6 F | HEIGHT: 71 IN

## 2021-05-27 DIAGNOSIS — E78.2 MIXED HYPERLIPIDEMIA: ICD-10-CM

## 2021-05-27 DIAGNOSIS — I10 ESSENTIAL HYPERTENSION: Primary | ICD-10-CM

## 2021-05-27 DIAGNOSIS — Z00.00 ANNUAL PHYSICAL EXAM: ICD-10-CM

## 2021-05-27 DIAGNOSIS — E66.9 OBESITY (BMI 30.0-34.9): ICD-10-CM

## 2021-05-27 PROCEDURE — 99396 PREV VISIT EST AGE 40-64: CPT | Performed by: INTERNAL MEDICINE

## 2021-05-27 PROCEDURE — 3725F SCREEN DEPRESSION PERFORMED: CPT | Performed by: INTERNAL MEDICINE

## 2021-05-27 RX ORDER — LISINOPRIL AND HYDROCHLOROTHIAZIDE 12.5; 1 MG/1; MG/1
1 TABLET ORAL DAILY
Qty: 90 TABLET | Refills: 1 | Status: SHIPPED | OUTPATIENT
Start: 2021-05-27 | End: 2022-02-08 | Stop reason: SDUPTHER

## 2021-05-27 RX ORDER — AMLODIPINE BESYLATE 5 MG/1
5 TABLET ORAL DAILY
Qty: 90 TABLET | Refills: 1 | Status: SHIPPED | OUTPATIENT
Start: 2021-05-27 | End: 2022-02-08 | Stop reason: SDUPTHER

## 2021-05-27 NOTE — TELEPHONE ENCOUNTER
Patient seen today for annual check up  Needs to set up appointment for annual check next year, May/June 2022 with Dr Og Chavez  Patient stated he will call back to schedule appointment

## 2021-05-27 NOTE — PROGRESS NOTES
Assessment/Plan:    Essential hypertension  Pressure is nicely controlled on current medications  Hyperlipidemia  Cholesterol is near goal levels  HDL cholesterol is low  LDL cholesterol is good and triglycerides are normal     Obesity (BMI 30 0-34  9)  Recommended increased physical activity, diet modifications  Diagnoses and all orders for this visit:    Essential hypertension  -     lisinopril-hydrochlorothiazide (PRINZIDE,ZESTORETIC) 10-12 5 MG per tablet; Take 1 tablet by mouth daily  -     amLODIPine (NORVASC) 5 mg tablet; Take 1 tablet (5 mg total) by mouth daily    Obesity (BMI 30 0-34  9)    Mixed hyperlipidemia          Subjective:      Patient ID: Joselyn Cox is a 63 y o  male  Patient presents for an  Annual preventive visit  He feels well  He offers no complaints  He denies any problems with his medication  Labs are reviewed  Lipids are near goal levels  HDL cholesterol is low  CBC is within normal limits  Metabolic panel likewise is unremarkable    Complains of some knee discomfort intermittently, usually associated with some form of physical activity involving a lot of bending and stretching      Family History   Problem Relation Age of Onset    Heart disease Mother     Heart disease Father     Stroke Father     Aortic stenosis Brother         Aortic valve replacement, carotid stenosis     Social History     Socioeconomic History    Marital status: /Civil Union     Spouse name: Not on file    Number of children: Not on file    Years of education: Not on file    Highest education level: Not on file   Occupational History    Not on file   Social Needs    Financial resource strain: Not on file    Food insecurity     Worry: Not on file     Inability: Not on file    Transportation needs     Medical: Not on file     Non-medical: Not on file   Tobacco Use    Smoking status: Former Smoker     Years: 10 00     Types: Cigarettes     Quit date: 1/1/2013     Years since quittin 4    Smokeless tobacco: Former User     Types: Chew    Tobacco comment: 1 cigarette per week for approximately 10 years   Substance and Sexual Activity    Alcohol use: Yes     Frequency: 2-4 times a month     Drinks per session: 3 or 4     Binge frequency: Never     Comment: social    Drug use: Never    Sexual activity: Not on file   Lifestyle    Physical activity     Days per week: Not on file     Minutes per session: Not on file    Stress: Not on file   Relationships    Social connections     Talks on phone: Not on file     Gets together: Not on file     Attends [employer] service: Not on file     Active member of club or organization: Not on file     Attends meetings of clubs or organizations: Not on file     Relationship status: Not on file    Intimate partner violence     Fear of current or ex partner: Not on file     Emotionally abused: Not on file     Physically abused: Not on file     Forced sexual activity: Not on file   Other Topics Concern    Not on file   Social History Narrative    Not on file     Past Medical History:   Diagnosis Date    GERD (gastroesophageal reflux disease)     Hypertension     Impingement syndrome of right shoulder 10/3/2019    Seasonal allergies        Current Outpatient Medications:     amLODIPine (NORVASC) 5 mg tablet, Take 1 tablet (5 mg total) by mouth daily, Disp: 90 tablet, Rfl: 1    fexofenadine (ALLEGRA) 180 MG tablet, Take 90 mg by mouth daily, Disp: , Rfl:     lisinopril-hydrochlorothiazide (PRINZIDE,ZESTORETIC) 10-12 5 MG per tablet, Take 1 tablet by mouth daily, Disp: 90 tablet, Rfl: 1    sildenafil (VIAGRA) 100 mg tablet, TAKE ONE TABLET BY MOUTH DAILY AS NEEDED FOR ERECTILE DYSFUNCTION, Disp: 10 tablet, Rfl: 0  Allergies   Allergen Reactions    Pollen Extract      Plants     Past Surgical History:   Procedure Laterality Date    BACK SURGERY  2005    Laminectomy    KNEE SURGERY Right          Review of Systems Constitutional: Negative  HENT: Negative  Eyes: Negative  Respiratory: Negative  Cardiovascular: Negative  Gastrointestinal: Negative  Endocrine: Negative  Genitourinary: Negative  Musculoskeletal: Negative  Allergic/Immunologic: Negative  Neurological: Negative  Hematological: Negative  Psychiatric/Behavioral: Negative  Objective:      /78   Pulse 95   Temp 98 6 °F (37 °C) (Tympanic)   Ht 5' 11" (1 803 m)   Wt 110 kg (241 lb 9 6 oz)   SpO2 98%   BMI 33 70 kg/m²   BMI Counseling: Body mass index is 33 7 kg/m²  The BMI is above normal  Nutrition recommendations include reducing portion sizes, decreasing overall calorie intake, 3-5 servings of fruits/vegetables daily, consuming healthier snacks, moderation in carbohydrate intake, increasing intake of lean protein, reducing intake of saturated fat and trans fat and reducing intake of cholesterol  Exercise recommendations include exercising 3-5 times per week  Physical Exam  Constitutional:       General: He is not in acute distress  Appearance: Normal appearance  He is obese  He is not ill-appearing, toxic-appearing or diaphoretic  HENT:      Head: Normocephalic and atraumatic  Eyes:      General: No scleral icterus  Conjunctiva/sclera: Conjunctivae normal       Pupils: Pupils are equal, round, and reactive to light  Neck:      Musculoskeletal: Neck supple  Vascular: No carotid bruit  Cardiovascular:      Rate and Rhythm: Normal rate and regular rhythm  Pulses: Normal pulses  Heart sounds: Normal heart sounds  No murmur  Pulmonary:      Effort: Pulmonary effort is normal  No respiratory distress  Breath sounds: Normal breath sounds  No wheezing or rales  Abdominal:      General: Abdomen is flat  Bowel sounds are normal  There is no distension  Musculoskeletal:         General: No swelling or tenderness  Right lower leg: No edema        Left lower leg: No edema    Skin:     General: Skin is warm  Coloration: Skin is not jaundiced  Findings: No bruising, erythema or rash  Neurological:      General: No focal deficit present  Mental Status: He is alert and oriented to person, place, and time  Mental status is at baseline  Psychiatric:         Mood and Affect: Mood normal          Behavior: Behavior normal          Thought Content:  Thought content normal          Judgment: Judgment normal

## 2021-05-27 NOTE — ASSESSMENT & PLAN NOTE
Cholesterol is near goal levels  HDL cholesterol is low    LDL cholesterol is good and triglycerides are normal

## 2021-06-01 ENCOUNTER — HOSPITAL ENCOUNTER (EMERGENCY)
Facility: HOSPITAL | Age: 56
Discharge: HOME | End: 2021-06-01
Attending: EMERGENCY MEDICINE
Payer: COMMERCIAL

## 2021-06-01 ENCOUNTER — APPOINTMENT (EMERGENCY)
Dept: RADIOLOGY | Facility: HOSPITAL | Age: 56
End: 2021-06-01
Payer: COMMERCIAL

## 2021-06-01 VITALS
OXYGEN SATURATION: 96 % | SYSTOLIC BLOOD PRESSURE: 151 MMHG | HEART RATE: 79 BPM | BODY MASS INDEX: 33.47 KG/M2 | WEIGHT: 240 LBS | DIASTOLIC BLOOD PRESSURE: 82 MMHG | TEMPERATURE: 97.9 F | RESPIRATION RATE: 18 BRPM

## 2021-06-01 DIAGNOSIS — S62.639A DISTAL PHALANX OR PHALANGES, CLOSED FRACTURE: ICD-10-CM

## 2021-06-01 DIAGNOSIS — S61.219A FINGER LACERATION: Primary | ICD-10-CM

## 2021-06-01 PROCEDURE — 73130 X-RAY EXAM OF HAND: CPT

## 2021-06-01 PROCEDURE — 99284 EMERGENCY DEPT VISIT MOD MDM: CPT | Performed by: EMERGENCY MEDICINE

## 2021-06-01 PROCEDURE — 99283 EMERGENCY DEPT VISIT LOW MDM: CPT

## 2021-06-01 PROCEDURE — 12002 RPR S/N/AX/GEN/TRNK2.6-7.5CM: CPT | Performed by: EMERGENCY MEDICINE

## 2021-06-01 RX ORDER — CEPHALEXIN 500 MG/1
500 CAPSULE ORAL EVERY 6 HOURS SCHEDULED
Qty: 28 CAPSULE | Refills: 0 | Status: SHIPPED | OUTPATIENT
Start: 2021-06-01 | End: 2021-06-08

## 2021-06-01 RX ORDER — CEPHALEXIN 250 MG/1
500 CAPSULE ORAL ONCE
Status: COMPLETED | OUTPATIENT
Start: 2021-06-01 | End: 2021-06-01

## 2021-06-01 RX ORDER — LIDOCAINE HYDROCHLORIDE 10 MG/ML
5 INJECTION, SOLUTION EPIDURAL; INFILTRATION; INTRACAUDAL; PERINEURAL ONCE
Status: COMPLETED | OUTPATIENT
Start: 2021-06-01 | End: 2021-06-01

## 2021-06-01 RX ORDER — OXYCODONE HYDROCHLORIDE AND ACETAMINOPHEN 5; 325 MG/1; MG/1
1 TABLET ORAL ONCE
Status: COMPLETED | OUTPATIENT
Start: 2021-06-01 | End: 2021-06-01

## 2021-06-01 RX ADMIN — LIDOCAINE HYDROCHLORIDE 5 ML: 10 INJECTION, SOLUTION EPIDURAL; INFILTRATION; INTRACAUDAL; PERINEURAL at 13:07

## 2021-06-01 RX ADMIN — OXYCODONE HYDROCHLORIDE AND ACETAMINOPHEN 1 TABLET: 5; 325 TABLET ORAL at 13:07

## 2021-06-01 RX ADMIN — CEPHALEXIN 500 MG: 250 CAPSULE ORAL at 13:48

## 2021-06-01 NOTE — Clinical Note
accompanied Othella Sever to the emergency department on 6/1/2021  Return date if applicable: [id number]        If you have any questions or concerns, please don't hesitate to call        Mateus Blunt MD

## 2021-06-01 NOTE — DISCHARGE INSTRUCTIONS
Follow-up with hand surgery in the next 1-2 weeks  If you have any significant worsening of pain, swelling, numbness, pus, fevers, chills, or any severe bleeding, return to the emergency department

## 2021-06-01 NOTE — ED PROVIDER NOTES
History  Chief Complaint   Patient presents with    Finger Laceration     Patient states that he was working with [employer] trimmers and it slipped and cut his left ring finger  HPI  Patient is a 74-year-old male presenting for evaluation of laceration to his left distal 4th digit of his hand  Patient states that he was using electric merlyn, that they slipped and cut the flexor aspect of his distal 4th digit  Patient applied direct pressure with hemostasis  Patient states sensation of swelling and mildly diminished sensation distal to the injury on the flexor surface, denies symptoms on the extensor surface  Patient denies injury to anywhere else in his hand  Patient denies loss of strength, is able to flex and extend appropriately  Patient states that he received a tetanus shot in last 10 years  Prior to Admission Medications   Prescriptions Last Dose Informant Patient Reported? Taking?    amLODIPine (NORVASC) 5 mg tablet   No Yes   Sig: Take 1 tablet (5 mg total) by mouth daily   fexofenadine (ALLEGRA) 180 MG tablet   Yes No   Sig: Take 90 mg by mouth daily   lisinopril-hydrochlorothiazide (PRINZIDE,ZESTORETIC) 10-12 5 MG per tablet   No Yes   Sig: Take 1 tablet by mouth daily   sildenafil (VIAGRA) 100 mg tablet   No No   Sig: TAKE ONE TABLET BY MOUTH DAILY AS NEEDED FOR ERECTILE DYSFUNCTION      Facility-Administered Medications: None       Past Medical History:   Diagnosis Date    GERD (gastroesophageal reflux disease)     Hypertension     Impingement syndrome of right shoulder 10/3/2019    Seasonal allergies        Past Surgical History:   Procedure Laterality Date    BACK SURGERY  2005    Laminectomy    KNEE SURGERY Right 1994       Family History   Problem Relation Age of Onset    Heart disease Mother     Heart disease Father     Stroke Father     Aortic stenosis Brother         Aortic valve replacement, carotid stenosis     I have reviewed and agree with the history as documented  E-Cigarette/Vaping    E-Cigarette Use Never User      E-Cigarette/Vaping Substances    Nicotine No     THC No     CBD No     Flavoring No     Other No     Unknown No      Social History     Tobacco Use    Smoking status: Former Smoker     Years: 10 00     Types: Cigarettes     Quit date: 2013     Years since quittin 4    Smokeless tobacco: Former User     Types: Chew    Tobacco comment: 1 cigarette per week for approximately 10 years   Substance Use Topics    Alcohol use: Yes     Frequency: 2-4 times a month     Drinks per session: 3 or 4     Binge frequency: Never     Comment: social    Drug use: Never        Review of Systems   Skin: Positive for wound  Negative for color change and pallor  Neurological: Positive for numbness (distal to wound)  Negative for weakness  Physical Exam  ED Triage Vitals [21 1252]   Temperature Pulse Respirations Blood Pressure SpO2   97 9 °F (36 6 °C) 79 18 151/82 96 %      Temp Source Heart Rate Source Patient Position - Orthostatic VS BP Location FiO2 (%)   Tympanic Monitor Lying Right arm --      Pain Score       4             Orthostatic Vital Signs  Vitals:    21 1252   BP: 151/82   Pulse: 79   Patient Position - Orthostatic VS: Lying       Physical Exam  Vitals signs and nursing note reviewed  Constitutional:       General: He is not in acute distress  Appearance: He is well-developed  He is not diaphoretic  HENT:      Head: Normocephalic and atraumatic  Right Ear: External ear normal       Left Ear: External ear normal       Nose: Nose normal       Mouth/Throat:      Pharynx: No oropharyngeal exudate  Eyes:      Conjunctiva/sclera: Conjunctivae normal       Pupils: Pupils are equal, round, and reactive to light  Cardiovascular:      Rate and Rhythm: Normal rate and regular rhythm  Heart sounds: Normal heart sounds  No murmur  No friction rub  No gallop      Pulmonary:      Effort: Pulmonary effort is normal  No respiratory distress  Breath sounds: Normal breath sounds  No wheezing  Chest:      Chest wall: No tenderness  Abdominal:      General: Bowel sounds are normal  There is no distension  Palpations: Abdomen is soft  There is no mass  Tenderness: There is no abdominal tenderness  There is no guarding or rebound  Musculoskeletal:         General: No deformity  Comments: Laceration through pad of his left 4th digit  Not actively bleeding  Not grossly contaminated  Normal capillary refill distal to injury  No nail bed involvement  Slightly diminished sensation to light touch of finger pad when compared to other digits  Full strength of flexion and extension at DIP   Skin:     General: Skin is warm and dry  Capillary Refill: Capillary refill takes less than 2 seconds  Neurological:      Mental Status: He is alert and oriented to person, place, and time     Psychiatric:         Behavior: Behavior normal          ED Medications  Medications   oxyCODONE-acetaminophen (PERCOCET) 5-325 mg per tablet 1 tablet (1 tablet Oral Given 6/1/21 1307)   lidocaine (PF) (XYLOCAINE-MPF) 1 % injection 5 mL (5 mL Infiltration Given by Other 6/1/21 1307)   cephalexin (KEFLEX) capsule 500 mg (500 mg Oral Given 6/1/21 1348)       Diagnostic Studies  Results Reviewed     None                 XR hand 3+ views LEFT    (Results Pending)         Procedures  Laceration repair    Date/Time: 6/1/2021 3:23 PM  Performed by: Padilla Choi MD  Authorized by: Padilla Choi MD   Body area: upper extremity  Location details: left ring finger  Laceration length: 4 (multiple overlying areas of laceration and maceration) cm  Anesthesia: digital block    Anesthesia:  Local Anesthetic: lidocaine 1% without epinephrine  Anesthetic total: 5 mL    Sedation:  Patient sedated: no      Wound Dehiscence:  Superficial Wound Dehiscence: simple closure      Procedure Details:  Irrigation solution: saline  Amount of cleaning: standard  Skin closure: 4-0 nylon  Number of sutures: 13  Approximation: close  Approximation difficulty: simple  Comments: Multiple parallel lacerations anchored together with closure, patient tolerated well            ED Course                                       MDM  Number of Diagnoses or Management Options  Distal phalanx or phalanges, closed fracture:   Finger laceration:   Diagnosis management comments: 13-year-old male with finger laceration, no obvious injury to flexor tendon, given mechanism will perform x-ray to evaluate for underlying fracture or retained foreign body, tetanus is up-to-date, likely closure under digital block  Small nondisplaced fracture on x-ray without retained foreign body  Patient given single dose of Keflex  Closure of laceration performed in ED under digital block, patient tolerated well, provided with 1 week prescription for Keflex, hand surgery follow-up, discharged with verbal and written return precautions regarding infection  Disposition  Final diagnoses:   Finger laceration   Distal phalanx or phalanges, closed fracture     Time reflects when diagnosis was documented in both MDM as applicable and the Disposition within this note     Time User Action Codes Description Comment    6/1/2021  3:21 PM Ally Underwood Add [H23 428B] Finger laceration     6/1/2021  3:22 PM Ally Underwood Add [S62 639A] Distal phalanx or phalanges, closed fracture       ED Disposition     ED Disposition Condition Date/Time Comment    Discharge Stable Tue Jun 1, 2021  3:20 PM Edie Tang discharge to home/self care              Follow-up Information     Follow up With Specialties Details Why [address], MD Orthopedic Surgery, Hand Surgery   [address]            Patient's Medications   Discharge Prescriptions    CEPHALEXIN (KEFLEX) 500 MG CAPSULE    Take 1 capsule (500 mg total) by mouth every 6 (six) hours for 7 days       Start Date: 6/1/2021  End Date: 6/8/2021       Order Dose: 500 mg       Quantity: 28 capsule    Refills: 0     No discharge procedures on file  PDMP Review     None           ED Provider  Attending physically available and evaluated Erlin Ames  I managed the patient along with the ED Attending      Electronically Signed by         Bonny Peterson MD  06/01/21 5728

## 2021-06-01 NOTE — Clinical Note
Joselyn Cox was seen and treated in our emergency department on 6/1/2021  Diagnosis:     Daily Andres    He may return on this date: 06/05/2021         If you have any questions or concerns, please don't hesitate to call        Abdifatah Gu MD    ______________________________           _______________          _______________  Hospital Representative                              Date                                Time

## 2021-06-01 NOTE — ED ATTENDING ATTESTATION
6/1/2021  I, Capo Sotelo MD, saw and evaluated the patient  I have discussed the patient with the resident/non-physician practitioner and agree with the resident's/non-physician practitioner's findings, Plan of Care, and MDM as documented in the resident's/non-physician practitioner's note, except where noted  All available labs and Radiology studies were reviewed  I was present for key portions of any procedure(s) performed by the resident/non-physician practitioner and I was immediately available to provide assistance  At this point I agree with the current assessment done in the Emergency Department    I have conducted an independent evaluation of this patient a history and physical is as follows:  Patient presents for evaluation of left 4th digit laceration the patient is right-hand dominant he was using electric merlyn for his shrubs slipped and hit the volar aspect of his distal 4th digit he has a stellate laceration  No foreign body sensation  Patient is up-to-date with tetanus tetanus in 2020  Neurovascular tendon function intact there is a stellate laceration over the volar pad of the 4th digit  X-ray reveals a distal tuft fracture  Patient will be given prophylactic antibiotics the wound will be irrigated cleaned and repaired patient will be advised to follow-up he will be given a prescription for antibiotics careful wound instructions will be given  ED Course         Critical Care Time  Procedures

## 2021-06-02 ENCOUNTER — TELEPHONE (OUTPATIENT)
Dept: OBGYN CLINIC | Facility: HOSPITAL | Age: 56
End: 2021-06-02

## 2021-06-02 NOTE — TELEPHONE ENCOUNTER
email sent to admin for force on request    I was contacted by the wife of patient Oracio Henry ([phone]) who was in the ER on 06 01 for a finger laceration accompanied with Nondisplaced tuft fracture 4th distal phalanx  Patient states he has [facility] and is requesting to see Dr Seleta Habermann specifically in [place] office  Can you help accommodate an appt with Dr Seleta Habermann?   # [phone]

## 2021-06-08 ENCOUNTER — OFFICE VISIT (OUTPATIENT)
Dept: OBGYN CLINIC | Facility: HOSPITAL | Age: 56
End: 2021-06-08
Payer: COMMERCIAL

## 2021-06-08 VITALS
HEART RATE: 74 BPM | WEIGHT: 240 LBS | HEIGHT: 71 IN | BODY MASS INDEX: 33.6 KG/M2 | DIASTOLIC BLOOD PRESSURE: 84 MMHG | SYSTOLIC BLOOD PRESSURE: 130 MMHG

## 2021-06-08 DIAGNOSIS — S62.639A CLOSED FRACTURE OF TUFT OF DISTAL PHALANX OF FINGER: ICD-10-CM

## 2021-06-08 DIAGNOSIS — S61.215A LACERATION OF LEFT RING FINGER WITHOUT FOREIGN BODY WITHOUT DAMAGE TO NAIL, INITIAL ENCOUNTER: ICD-10-CM
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
## 2021-06-08 PROCEDURE — 99213 OFFICE O/P EST LOW 20 MIN: CPT | Performed by: PHYSICIAN ASSISTANT

## 2021-06-08 NOTE — PROGRESS NOTES
Assessment/Plan   Diagnoses and all orders for this visit:    Accident caused by powered hand tool, initial encounter    Closed fracture of tuft of distal phalanx of finger    Laceration of left ring finger without foreign body without damage to nail, initial encounter    - Re-dressed today  - Follow up on Yissel 10-14 for suture removal      Subjective   Patient ID: Jasvir Meraz is a 63 y o  male  Vitals:    21 0944   BP: 130/84   Pulse: 76     54yo male comes in for an evaluation of his left ring finger  He was injured on 21 when he cut his finger with a   This was sutured in the ER  Xrays showed a tuft fracture  He is doing very well now and is not having much pain  The pain is dull in character, mild in severity, pain does not radiate and is not associated with numbness          The following portions of the patient's history were reviewed and updated as appropriate: allergies, current medications, past family history, past medical history, past social history, past surgical history and problem list     Review of Systems  Ortho Exam  Past Medical History:   Diagnosis Date    GERD (gastroesophageal reflux disease)     Hypertension     Impingement syndrome of right shoulder 10/3/2019    Seasonal allergies      Past Surgical History:   Procedure Laterality Date    BACK SURGERY  2005    Laminectomy    KNEE SURGERY Right      Family History   Problem Relation Age of Onset    Heart disease Mother     Heart disease Father     Stroke Father     Aortic stenosis Brother         Aortic valve replacement, carotid stenosis     Social History     Occupational History    Not on file   Tobacco Use    Smoking status: Former Smoker     Years: 10      Types: Cigarettes     Quit date: 2013     Years since quittin 4    Smokeless tobacco: Former User     Types: Chew    Tobacco comment: 1 cigarette per week for approximately 10 years   Substance and Sexual Activity    Alcohol use: Yes     Frequency: 2-4 times a month     Drinks per session: 3 or 4     Binge frequency: Never     Comment: social    Drug use: Never    Sexual activity: Not on file       Review of Systems   Constitutional: Negative  HENT: Negative  Eyes: Negative  Respiratory: Negative  Cardiovascular: Negative  Gastrointestinal: Negative  Endocrine: Negative  Genitourinary: Negative  Musculoskeletal: As below      Allergic/Immunologic: Negative  Neurological: Negative  Hematological: Negative  Psychiatric/Behavioral: Negative  Objective   Physical Exam    · Constitutional: Awake, Alert, Oriented  · Eyes: EOMI  · Psych: Mood and affect appropriate  · Heart: regular rate and rhythm  · Lungs: No audible wheezing  · Abdomen: soft  · Lymph: no lymphedema   left ring finger:  - Appearance   There are complex, multidirectional lacerations intact with sutures  No cyanosis or erythema  No active bleeding   - Palpation  o Moderate distal phalanx tenderness  - ROM  o not examined due to fracture status  - Motor  o Not tested due to fracture status  - Special Tests  o Denies numbness to light touch distally, but states it feels "swollen"      I have personally reviewed pertinent films in PACS and my interpretation is tuft fracture

## 2021-06-14 ENCOUNTER — EVALUATION (OUTPATIENT)
Dept: PHYSICAL THERAPY | Facility: OTHER | Age: 56
End: 2021-06-14
Payer: COMMERCIAL

## 2021-06-14 ENCOUNTER — OFFICE VISIT (OUTPATIENT)
Dept: OBGYN CLINIC | Facility: CLINIC | Age: 56
End: 2021-06-14
Payer: COMMERCIAL

## 2021-06-14 ENCOUNTER — TELEPHONE (OUTPATIENT)
Dept: OBGYN CLINIC | Facility: HOSPITAL | Age: 56
End: 2021-06-14

## 2021-06-14 VITALS
SYSTOLIC BLOOD PRESSURE: 147 MMHG | RESPIRATION RATE: 18 BRPM | BODY MASS INDEX: 33.6 KG/M2 | WEIGHT: 240 LBS | HEIGHT: 71 IN | DIASTOLIC BLOOD PRESSURE: 89 MMHG | HEART RATE: 88 BPM

## 2021-06-14 DIAGNOSIS — M25.561 CHRONIC PAIN OF RIGHT KNEE: Primary | ICD-10-CM

## 2021-06-14 DIAGNOSIS — G89.29 CHRONIC PAIN OF RIGHT KNEE: Primary | ICD-10-CM

## 2021-06-14 DIAGNOSIS — S62.639A CLOSED FRACTURE OF TUFT OF DISTAL PHALANX OF FINGER: ICD-10-CM
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
## 2021-06-14 DIAGNOSIS — S61.215D LACERATION OF LEFT RING FINGER WITHOUT FOREIGN BODY WITHOUT DAMAGE TO NAIL, SUBSEQUENT ENCOUNTER: ICD-10-CM

## 2021-06-14 PROCEDURE — 97163 PT EVAL HIGH COMPLEX 45 MIN: CPT | Performed by: PHYSICAL THERAPIST

## 2021-06-14 PROCEDURE — 97140 MANUAL THERAPY 1/> REGIONS: CPT | Performed by: PHYSICAL THERAPIST

## 2021-06-14 PROCEDURE — 97110 THERAPEUTIC EXERCISES: CPT | Performed by: PHYSICAL THERAPIST

## 2021-06-14 PROCEDURE — 3008F BODY MASS INDEX DOCD: CPT | Performed by: ORTHOPAEDIC SURGERY

## 2021-06-14 PROCEDURE — 99214 OFFICE O/P EST MOD 30 MIN: CPT | Performed by: SURGERY

## 2021-06-14 NOTE — PROGRESS NOTES
Lissette VALENCIA  Attending, Orthopaedic Surgery  Hand, Wrist, and Elbow Surgery  [address] [facility]      ORTHOPAEDIC HAND, WRIST, AND ELBOW OFFICE  VISIT       ASSESSMENT/PLAN:      63year old male here for tuft fracture of the left ring finger  Sutures were taken out today and the finger was dressed with finger socks and zachery  Explained that the dressing is breathable  Instructed the patient to not allow the finger to soak in standing water of any kind that includes dishwater, pools, hot tub, bath, Ocean and lake  He can shower, wet, pat dry  On exam he does not seem hypersensitive, but at this point he can gently rub around the tip of the finger to keep it from becoming hypersensitive  Explained that it may have cold sensitive knee in the future  We will follow up with the patient 7-10 days for laceration re-check  The patient verbalized understanding of exam findings and treatment plan  We engaged in the shared decision-making process and treatment options were discussed at length with the patient  Surgical and conservative management discussed today along with risks and benefits  Diagnoses and all orders for this visit:    Accident caused by powered hand tool, initial encounter    Closed fracture of tuft of distal phalanx of finger    Laceration of left ring finger without foreign body without damage to nail, subsequent encounter        Follow Up:  Return for Recheck 7-10 days per [facility]  To Do Next Visit:  Re-evaluation of current issue    ____________________________________________________________________________________________________________________________________________      CHIEF COMPLAINT:  Chief Complaint   Patient presents with    Left Ring Finger - Pain, Follow-up       SUBJECTIVE:  Lena Sánchez is a 63y o  year old RHD male who presents  Today for consultation for his left ring finger laceration  Referred by Derek Collins PA-C   Patient states that he was using electric merlyn, that they slipped and cut the flexor aspect of his distal 4th digit , 21  The laceration was repaired and he was placed on Keflex for infection prophylaxis         Pain/symptom timing:  Worse during the day when active  Pain/symptom context:  Worse with activites and work  Pain/symptom modifying factors:  Rest makes better, activities make worse  Pain/symptom associated signs/symptoms: none    Prior treatment   · NSAIDsNo   · Injections No   · Bracing/Orthotics No    Physical Therapy No     I have personally reviewed all the relevant PMH, PSH, SH, FH, Medications and allergies      PAST MEDICAL HISTORY:  Past Medical History:   Diagnosis Date    GERD (gastroesophageal reflux disease)     Hypertension     Impingement syndrome of right shoulder 10/3/2019    Seasonal allergies        PAST SURGICAL HISTORY:  Past Surgical History:   Procedure Laterality Date    BACK SURGERY  2005    Laminectomy    KNEE SURGERY Right 1994       FAMILY HISTORY:  Family History   Problem Relation Age of Onset    Heart disease Mother     Heart disease Father     Stroke Father     Aortic stenosis Brother         Aortic valve replacement, carotid stenosis       SOCIAL HISTORY:  Social History     Tobacco Use    Smoking status: Former Smoker     Years: 10 00     Types: Cigarettes     Quit date: 2013     Years since quittin 4    Smokeless tobacco: Former User     Types: Chew    Tobacco comment: 1 cigarette per week for approximately 10 years   Vaping Use    Vaping Use: Never used   Substance Use Topics    Alcohol use: Yes     Comment: social    Drug use: Never       MEDICATIONS:    Current Outpatient Medications:     amLODIPine (NORVASC) 5 mg tablet, Take 1 tablet (5 mg total) by mouth daily, Disp: 90 tablet, Rfl: 1    fexofenadine (ALLEGRA) 180 MG tablet, Take 90 mg by mouth daily, Disp: , Rfl:     lisinopril-hydrochlorothiazide (PRINZIDE,ZESTORETIC) 10-12 5 MG per tablet, Take 1 tablet by mouth daily, Disp: 90 tablet, Rfl: 1    sildenafil (VIAGRA) 100 mg tablet, TAKE ONE TABLET BY MOUTH DAILY AS NEEDED FOR ERECTILE DYSFUNCTION, Disp: 10 tablet, Rfl: 0    ALLERGIES:  Allergies   Allergen Reactions    Pollen Extract      Plants           REVIEW OF SYSTEMS:  Review of Systems   Constitutional: Negative for chills, fever and unexpected weight change  HENT: Negative for hearing loss, nosebleeds and sore throat  Eyes: Negative for pain, redness and visual disturbance  Respiratory: Negative for cough, shortness of breath and wheezing  Cardiovascular: Negative for chest pain, palpitations and leg swelling  Gastrointestinal: Negative for abdominal pain, nausea and vomiting  Endocrine: Negative for polydipsia and polyuria  Genitourinary: Negative for dysuria and hematuria  Skin: Negative for rash and wound  Neurological: Negative for dizziness, light-headedness and headaches  Psychiatric/Behavioral: Negative for decreased concentration, dysphoric mood and suicidal ideas  The patient is not nervous/anxious          VITALS:   Vitals:    06/14/21 1008   BP: 147/89   Pulse: 88   Resp: 18       LABS:  HgA1c:   Lab Results   Component Value Date    HGBA1C 5 4 05/19/2021     BMP:   Lab Results   Component Value Date    CALCIUM 9 3 05/18/2021    K 4 5 05/18/2021    CO2 32 05/18/2021    CL 99 05/18/2021    BUN 17 05/18/2021    CREATININE 0 91 05/18/2021       _____________________________________________________  PHYSICAL EXAMINATION:  General: well developed and well nourished, alert, oriented times 3 and appears comfortable  Psychiatric: Normal  HEENT: Normocephalic, Atraumatic Trachea Midline, No torticollis  Pulmonary: No audible wheezing or respiratory distress   Abdomen/GI: Non tender, non distended   Cardiovascular: No pitting edema, 2+ radial pulse   Skin: No Masses, No Erythema, Laceration  Over the radial aspect of the left ring finger, No Fluctuation, No Ulcerations  Neurovascular: Sensation Intact to the Median, Ulnar, Radial Nerve, Motor Intact to the Median, Ulnar, Radial Nerve and Pulses Intact  Musculoskeletal: Normal, except as noted in detailed exam and in HPI  MUSCULOSKELETAL EXAMINATION:    Left hand:   Mild swelling of the ring finger  Sutures intact with dried blood around the laceration  Laceration is circumferential from the ulnar side of the ring finger to the pad  Nail matrix is intact  +2 ring finger form distal moore crease  FDS & FDP are intact  Sensation is intact to light touch  Brisk capillary refill   ___________________________________________________  STUDIES REVIEWED:  I have personally reviewed  06/01/2021 AP lateral and oblique radiographs of  Left hand 3 views which demonstrate left ring tuft fracture        PROCEDURES PERFORMED:  Procedures  No Procedures performed today    _____________________________________________________      Catherine Ricci    I,:  Nik Gipson am acting as a scribe while in the presence of the attending physician :       I,:  Donnie Cabrera MD personally performed the services described in this documentation    as scribed in my presence :

## 2021-06-14 NOTE — PROGRESS NOTES
PT Evaluation     Today's date: 2021  Patient name: Nemo Espinoza  : 1965  MRN: [id number]  Referring provider: Dionisio Soto*  Dx:   Encounter Diagnosis     ICD-10-CM    1  Chronic pain of right knee  M25 561     G89 29        Start Time: 1400  Stop Time: 1500  Total time in clinic (min): 60 minutes    Assessment  Assessment details: Nemo Espinoza is a 63 y o  male who presents with complaints of chronic pain of right knee  (primary encounter diagnosis)  No further referral appears necessary at this time based upon examination results  Patient presents to PT with impaired strength, impaired ROM, decreased flexibility and impaired ability to complete IADLs  Prognosis is good given HEP compliance and PT 2-3x/wk  Positive prognostic indicators include positive attitude toward recovery  Please contact me if you have any questions or recommendations  Thank you for the opportunity to share in  [facility] care  Impairments: abnormal coordination, abnormal muscle firing, abnormal or restricted ROM, activity intolerance, impaired physical strength, lacks appropriate home exercise program, pain with function and poor body mechanics    Symptom irritability: moderateBarriers to therapy: Complex PMH   Understanding of Dx/Px/POC: good   Prognosis: good    Goals  Short Term:  Pt will report decreased levels of pain by at least 2 subjective ratings in 4 weeks  Pt will demonstrate improved ROM by at least 10 degrees in 4 weeks  Pt will demonstrate improved strength by 1/2 grade  Long Term:   Pt will be independent in their HEP in 8 weeks  Pt will be be pain free with IADL's  Pt will demonstrate improved FOTO, > 80         Plan  Plan details: Prognosis above is given PT services 2x/week tapering to 1x/week over the next 3 months and home program adherence    Patient would benefit from: skilled physical therapy  Planned modality interventions: thermotherapy: hydrocollator packs, cryotherapy, electrical stimulation/Russian stimulation and low level laser therapy  Planned therapy interventions: activity modification, joint mobilization, manual therapy, motor coordination training, neuromuscular re-education, patient education, self care, therapeutic activities, therapeutic exercise, graded activity, home exercise program, balance, strengthening, stretching, flexibility and functional ROM exercises  Frequency: 2x week  Duration in weeks: 4  Plan of Care beginning date: 6/14/2021  Plan of Care expiration date: 7/14/2021  Treatment plan discussed with: patient        Subjective Evaluation    History of Present Illness  Mechanism of injury: Since Mother's Day, he has been dealing with the knee pain  He said that it gets better and then worse  Patient said that his knee really bothers him when he negotiates steps  He also said that he feels pain if his knees touch each other while sleeping at night  Patient said that when he was working for 2 hours as a  the next day he had pain  Patient is frustrated  However he is wants to know he should see for this issue- sports medicine or an orthopedic  Pain   R Knee   Currently 0/10  At Best 0/10  At Worst 8/10  Patient described the pain as a sharp stabbing sensation accompanied by locking of the R knee      AGGS: walking for an extended period of time, sitting for an extended period of time, steps   EASES: ice  Patient's Goal: "I want to know who I should consult next "          Objective     General Comments:      Knee Comments  LQS: WNL     ROM:   WNL     Special Tests:   Anterior Drawer -   Posterior Drawer -   Lachman - but no clunk   Eduardo +   Valgus -  Varus -   Patellar Compression Test -     Segmental Mobility:   Hypomobile patella in all planes  Hypomobile Tibial Glides in all planes including rotation     Observation: swelling noted in the disolateral component of the IT band     Palpation: hard mass in the distolateral component of the IT band      Precautions: complex PMH     Daily Treatment Log  Manuals             FR             Patellar Mobs             Tibial Mobs                          Neuro Re-Ed             QS             TC             GS                                                                 Ther Ex             Calf Strap Stretch             Hamstring Strap Stretch                                                                                           Ther Activity                                       Gait Training                                       Modalities

## 2021-06-14 NOTE — TELEPHONE ENCOUNTER
Patient saw Dr Soumya Cain today and is supposed to f/u with her in 7-10 days to check for infection  Patient was scheduled this week on 06/17/21  Its too soon, patient is requesting to be rescheduled for sometime next week  Sent an email to Perry to adjust appt

## 2021-06-17 ENCOUNTER — HOSPITAL ENCOUNTER (OUTPATIENT)
Dept: RADIOLOGY | Facility: HOSPITAL | Age: 56
Discharge: HOME | End: 2021-06-17
Attending: ORTHOPAEDIC SURGERY
Payer: COMMERCIAL

## 2021-06-17 ENCOUNTER — OFFICE VISIT (OUTPATIENT)
Dept: OBGYN CLINIC | Facility: HOSPITAL | Age: 56
End: 2021-06-17
Payer: COMMERCIAL

## 2021-06-17 ENCOUNTER — OFFICE VISIT (OUTPATIENT)
Dept: PHYSICAL THERAPY | Facility: OTHER | Age: 56
End: 2021-06-17
Payer: COMMERCIAL

## 2021-06-17 VITALS
BODY MASS INDEX: 33.47 KG/M2 | SYSTOLIC BLOOD PRESSURE: 138 MMHG | HEART RATE: 89 BPM | DIASTOLIC BLOOD PRESSURE: 91 MMHG | HEIGHT: 71 IN

## 2021-06-17 DIAGNOSIS — M25.561 CHRONIC PAIN OF RIGHT KNEE: Primary | ICD-10-CM

## 2021-06-17 DIAGNOSIS — G89.29 CHRONIC PAIN OF RIGHT KNEE: Primary | ICD-10-CM

## 2021-06-17 DIAGNOSIS — M25.561 RIGHT KNEE PAIN, UNSPECIFIED CHRONICITY: ICD-10-CM

## 2021-06-17 DIAGNOSIS — M25.561 RIGHT KNEE PAIN, UNSPECIFIED CHRONICITY: Primary | ICD-10-CM

## 2021-06-17 PROCEDURE — 3080F DIAST BP >= 90 MM HG: CPT | Performed by: ORTHOPAEDIC SURGERY

## 2021-06-17 PROCEDURE — 97112 NEUROMUSCULAR REEDUCATION: CPT | Performed by: PHYSICAL THERAPIST

## 2021-06-17 PROCEDURE — 1036F TOBACCO NON-USER: CPT | Performed by: ORTHOPAEDIC SURGERY

## 2021-06-17 PROCEDURE — 97110 THERAPEUTIC EXERCISES: CPT | Performed by: PHYSICAL THERAPIST

## 2021-06-17 PROCEDURE — 99213 OFFICE O/P EST LOW 20 MIN: CPT | Performed by: ORTHOPAEDIC SURGERY

## 2021-06-17 PROCEDURE — 97140 MANUAL THERAPY 1/> REGIONS: CPT | Performed by: PHYSICAL THERAPIST

## 2021-06-17 PROCEDURE — 3075F SYST BP GE 130 - 139MM HG: CPT | Performed by: ORTHOPAEDIC SURGERY

## 2021-06-17 PROCEDURE — 73562 X-RAY EXAM OF KNEE 3: CPT

## 2021-06-17 NOTE — PROGRESS NOTES
63 y o male presents for evaluation of right knee pain  Patient has a remote history approximately 30 years ago of right ACL reconstruction as well as meniscus injury from skiing  He underwent a 2 year delayed reconstruction  He states that since his surgery, he has had intermittent swelling and intermittent instability  Patient states that on mother's Day he began to experience a tightness over his knee  Symptoms are exacerbated ascending and descending stairs  Patient iced his knee and take over-the-counter anti-inflammatory medication with mild relief of symptoms  He recently underwent a course of physical therapy that focused on his quadriceps  He states that since this manipulation, his symptoms have greatly improved  Today patient reports 100% improvement in his symptoms  He reports no pain on today's examination  Review of Systems  Review of systems negative unless otherwise specified in HPI    Past Medical History  Past Medical History:   Diagnosis Date    GERD (gastroesophageal reflux disease)     Hypertension     Impingement syndrome of right shoulder 10/3/2019    Seasonal allergies        Past Surgical History  Past Surgical History:   Procedure Laterality Date    BACK SURGERY  2005    Laminectomy    KNEE SURGERY Right 1994       Current Medications  Current Outpatient Medications on File Prior to Visit   Medication Sig Dispense Refill    amLODIPine (NORVASC) 5 mg tablet Take 1 tablet (5 mg total) by mouth daily 90 tablet 1    fexofenadine (ALLEGRA) 180 MG tablet Take 90 mg by mouth daily      lisinopril-hydrochlorothiazide (PRINZIDE,ZESTORETIC) 10-12 5 MG per tablet Take 1 tablet by mouth daily 90 tablet 1    sildenafil (VIAGRA) 100 mg tablet TAKE ONE TABLET BY MOUTH DAILY AS NEEDED FOR ERECTILE DYSFUNCTION 10 tablet 0     No current facility-administered medications on file prior to visit         Recent Labs (HCT,HGB,PT,INR,ESR,CRP,GLU,HgA1C)  0   Lab Value Date/Time    HCT 46 8 05/18/2021 1121    HGB 16 3 05/18/2021 1121    WBC 8 7 05/18/2021 1121    HGBA1C 5 4 05/19/2021 1128    HGBA1C 5 6 07/30/2020 0741         Physical exam  · General: Awake, Alert, Oriented  · Eyes: Pupils equal, round and reactive to light  · Heart: regular rate and rhythm  · Lungs: No audible wheezing  · Abdomen: soft  right Knee exam  · Skin intact, well-healed ACL surgical incisions  · Knee range of motion 5 to greater than 120°   · Knee stable to varus and valgus stress mid flexion full extension   · Mild increased excursion with anterior drawer   · No instability with Lachman's maneuver   · No instability posterior drawer   · Negative mediolateral Eduardo's   · Negative pivot shift  · No palpable knee effusion   · Mild tenderness palpation along lateral joint line, no tenderness palpation along medial joint line  · No fullness or masses noted and popliteal fossa   · Distal motor and sensation grossly intact   · Distal extremity warm and pink    Procedure  None    Imaging  X-rays of right knee taken reviewed detail at today's visit  This reveals previous evidence of ACL reconstruction with interference screw present in tibial and femoral tunnels  Relatively well-preserved medial lateral compartment joint space with minimal narrowing  Minimal narrowing of patellofemoral compartment       43-year-old male with history of ACL reconstruction and mild right knee osteoarthritis with significant symptomatic improvement following physical therapy     Weightbearing as tolerated right lower extremity   Activity as tolerated   Advised to continue physical therapy as needed for symptomatic relief  Advised continue over-the-counter anti-inflammatory medication for symptomatic relief  Explained in detail to the patient that his symptoms likely stem from overuse leading up to mother's Day  Should his symptoms persist or worsen, patient is welcome to return to the office for further evaluation      Patient may follow up in office on an as-needed basis

## 2021-06-17 NOTE — PROGRESS NOTES
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  Patient said that his low back was giving him a lot of issues  Objective: See treatment diary below    Assessment: Tolerated treatment well  Patient demonstrated fatigue post treatment, exhibited good technique with therapeutic exercises and would benefit from continued PT    Plan: Continue per plan of care       Precautions: complex PMH     Daily Treatment Log   Manuals        SI Evaluation [facility] 10'        SI Correction [facility] 5'        FR  [facility]       Patellar Mobs [facility]       Tibial Mobs [facility]       Neuro Re-Ed         Prone GS  30 x 5"        PBKHE 20 x 5" ea       SLR Flexion  2 x 10        Clamshells  20 x 5" ea       MF Press GTB 30x ea                       Ther Ex        Leg Press DL #30 30x   SL Ecc #30 30x       HR  30x        TR 30x                                                Ther Activity                        Gait Training                        Modalities

## 2021-06-21 ENCOUNTER — OFFICE VISIT (OUTPATIENT)
Dept: PHYSICAL THERAPY | Facility: OTHER | Age: 56
End: 2021-06-21
Payer: COMMERCIAL

## 2021-06-21 DIAGNOSIS — G89.29 CHRONIC PAIN OF RIGHT KNEE: Primary | ICD-10-CM

## 2021-06-21 DIAGNOSIS — M25.561 CHRONIC PAIN OF RIGHT KNEE: Primary | ICD-10-CM

## 2021-06-21 PROCEDURE — 97110 THERAPEUTIC EXERCISES: CPT | Performed by: PHYSICAL THERAPIST

## 2021-06-21 PROCEDURE — 97112 NEUROMUSCULAR REEDUCATION: CPT | Performed by: PHYSICAL THERAPIST

## 2021-06-21 PROCEDURE — 97140 MANUAL THERAPY 1/> REGIONS: CPT | Performed by: PHYSICAL THERAPIST

## 2021-06-24 ENCOUNTER — OFFICE VISIT (OUTPATIENT)
Dept: PHYSICAL THERAPY | Facility: OTHER | Age: 56
End: 2021-06-24
Payer: COMMERCIAL

## 2021-06-24 DIAGNOSIS — M25.561 CHRONIC PAIN OF RIGHT KNEE: Primary | ICD-10-CM

## 2021-06-24 DIAGNOSIS — G89.29 CHRONIC PAIN OF RIGHT KNEE: Primary | ICD-10-CM

## 2021-06-24 PROCEDURE — 97140 MANUAL THERAPY 1/> REGIONS: CPT | Performed by: PHYSICAL THERAPIST

## 2021-06-24 PROCEDURE — 97110 THERAPEUTIC EXERCISES: CPT | Performed by: PHYSICAL THERAPIST

## 2021-06-24 NOTE — PROGRESS NOTES
Daily Note     Today's date: 2021  Patient name: Felicia Andersen  : 1965  MRN: [id number]  Referring provider: Parish Byrne*  Dx:   Encounter Diagnosis     ICD-10-CM    1  Chronic pain of right knee  M25 561     G89 29      1 on 1 entire duration   Start Time: 1100  Stop Time: 1200  Total time in clinic (min): 60 minutes    Subjective: Patient reports he was never able to kneel  At the end of the session, patient told PT he was able to kneel  He said "this feels strange because I'm not used to kneeling without pain "        Objective: See treatment diary below    Assessment: Tolerated treatment well  Patient demonstrated fatigue post treatment, exhibited good technique with therapeutic exercises and would benefit from continued PT    Plan: Continue per plan of care       Precautions: complex PMH     Daily Treatment Log   Manuals       SI Evaluation [facility] 10'        SI Correction [facility] 5'        FR  [facility]      Patellar Mobs [facility]      Tibial Mobs [facility]      MFDc   [facility] 15'                               Neuro Re-Ed        Prone GS  30 x 5"        PBKHE 20 x 5" ea       SLR Flexion  2 x 10        Clamshells  20 x 5" ea 20 x 5"       MF Press GTB 30x ea       HS Isometrics  2 x 10 x 5" ea              Ther Ex       Bike   12'       Slantboard  10 x 10" ea   Gastroc  Soleus      Leg Press DL #30 30x   SL Ecc #30 30x       HR  30x        TR 30x                                                Ther Activity                        Gait Training                        Modalities

## 2021-06-26 NOTE — PROGRESS NOTES
Daily Note     Today's date: 2021  Patient name: Jordyn Munson  : 1965  MRN: [id number]  Referring provider: Colten Wilson*  Dx:   Encounter Diagnosis     ICD-10-CM    1  Chronic pain of right knee  M25 561     G89 29      IEP [id number]  1 on 1 [id number]  IEP [id number]  Start Time: 2562  Stop Time: 1545  Total time in clinic (min): 60 minutes    Subjective: Patient reports his low back is starting to feel better  Continue to progress patient per tolerance  Objective: See treatment diary below    Assessment: Tolerated treatment well  Patient demonstrated fatigue post treatment, exhibited good technique with therapeutic exercises and would benefit from continued PT    Plan: Continue per plan of care       Precautions: complex PMH     Daily Treatment Log   Manuals       SI Evaluation [facility] 10'  [facility]       SI Correction [facility] 5'  [facility]      FR  [facility]      Patellar Mobs [facility]      Tibial Mobs [facility]      Neuro Re-Ed        Prone GS  30 x 5"  30 x 5"       PBKHE 20 x 5" ea 30 x 5" ea      SLR Flexion  2 x 10  3 x 10      Clamshells  20 x 5" ea 30 x 5" ea      MF Press GTB 30x ea GTB 30x ea                      Ther Ex       Leg Press DL #30 30x   SL Ecc #30 30x DL #30 30x   SL Ecc #30 30x      HR  30x  30x       TR 30x  30x                                               Ther Activity                        Gait Training                        Modalities

## 2021-06-28 ENCOUNTER — OFFICE VISIT (OUTPATIENT)
Dept: PHYSICAL THERAPY | Facility: OTHER | Age: 56
End: 2021-06-28
Payer: COMMERCIAL

## 2021-06-28 DIAGNOSIS — M25.561 CHRONIC PAIN OF RIGHT KNEE: Primary | ICD-10-CM

## 2021-06-28 DIAGNOSIS — G89.29 CHRONIC PAIN OF RIGHT KNEE: Primary | ICD-10-CM

## 2021-06-28 PROCEDURE — 97110 THERAPEUTIC EXERCISES: CPT | Performed by: PHYSICAL THERAPIST

## 2021-06-28 PROCEDURE — 97112 NEUROMUSCULAR REEDUCATION: CPT | Performed by: PHYSICAL THERAPIST

## 2021-06-28 PROCEDURE — 97140 MANUAL THERAPY 1/> REGIONS: CPT | Performed by: PHYSICAL THERAPIST

## 2021-06-28 NOTE — PROGRESS NOTES
Daily Note     Today's date: 2021  Patient name: Lien Cramer  : 1965  MRN: [id number]  Referring provider: Sherry Fagan*  Dx:   Encounter Diagnosis     ICD-10-CM    1  Chronic pain of right knee  M25 561     G89 29      1 on 1 entire duration   Start Time: 1100  Stop Time: 1145  Total time in clinic (min): 45 minutes    Subjective: Patient reports his knee was giving him issues on the leg press  Monitor this NV  Objective: See treatment diary below    Assessment: Tolerated treatment well  Patient demonstrated fatigue post treatment, exhibited good technique with therapeutic exercises and would benefit from continued PT    Plan: Continue per plan of care       Precautions: complex PMH     Daily Treatment Log   Manuals      SI Evaluation [facility] 10'        SI Correction [facility] 5'        FR  [facility]     Patellar Mobs [facility]     Tibial Mobs [facility]     MFDc   [facility] 15'                               Neuro Re-Ed       Prone GS  30 x 5"        PBKHE 20 x 5" ea       SLR Flexion  2 x 10   #2 3 x 10 ea     Clamshells  20 x 5" ea 20 x 5"       MF Twist   [place] #15  3 x 10     MF Press GTB 30x ea  [place] #15  3 x 10     MF Walkout   [place] #15  5x ea     HS Isometrics  2 x 10 x 5" ea              Ther Ex      Bike   12'  10'      Slantboard  10 x 10" ea   Gastroc  Soleus 10 x 10" ea  Gastroc   Soleus     Leg Press DL #30 30x   SL Ecc #30 30x  DL #30 5'      HR  30x        TR 30x                                                Ther Activity                        Gait Training                        Modalities

## 2021-06-30 ENCOUNTER — OFFICE VISIT (OUTPATIENT)
Dept: OBGYN CLINIC | Facility: CLINIC | Age: 56
End: 2021-06-30
Payer: COMMERCIAL

## 2021-06-30 VITALS
DIASTOLIC BLOOD PRESSURE: 81 MMHG | HEIGHT: 71 IN | BODY MASS INDEX: 33.4 KG/M2 | SYSTOLIC BLOOD PRESSURE: 117 MMHG | HEART RATE: 72 BPM | RESPIRATION RATE: 17 BRPM | WEIGHT: 238.6 LBS

## 2021-06-30 DIAGNOSIS — S62.639A CLOSED FRACTURE OF TUFT OF DISTAL PHALANX OF FINGER: ICD-10-CM
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
## 2021-06-30 PROCEDURE — 3074F SYST BP LT 130 MM HG: CPT | Performed by: SURGERY

## 2021-06-30 PROCEDURE — 99213 OFFICE O/P EST LOW 20 MIN: CPT | Performed by: SURGERY

## 2021-06-30 PROCEDURE — 1036F TOBACCO NON-USER: CPT | Performed by: SURGERY

## 2021-06-30 PROCEDURE — 3079F DIAST BP 80-89 MM HG: CPT | Performed by: SURGERY

## 2021-06-30 PROCEDURE — 3008F BODY MASS INDEX DOCD: CPT | Performed by: SURGERY

## 2021-06-30 NOTE — PROGRESS NOTES
ASSESSMENT/PLAN:       58-year-old male here for his left finger laceration sustained on 06/01/2021 from an electric saw  The laceration is well healed with some mild swelling at the tip of finger  On exam he does not seem hypersensitive, but at this point he can gently rub around the tip of the finger to keep it from becoming hypersensitive  Explained that it may have cold sensitive knee in the future  Advised to scar massage  We will follow up as needed  The patient verbalized understanding of exam findings and treatment plan  We engaged in the shared decision-making process and treatment options were discussed at length with the patient  Surgical and conservative management discussed today along with risks and benefits  Diagnoses and all orders for this visit:    Accident caused by powered hand tool, initial encounter    Closed fracture of tuft of distal phalanx of finger        Follow Up:  Return if symptoms worsen or fail to improve  To Do Next Visit:  Re-evaluation of current issue    ____________________________________________________________________________________________________________________________________________      CHIEF COMPLAINT:  Chief Complaint   Patient presents with    Left Ring Finger - Follow-up       SUBJECTIVE:  Erica Catalan is a 63y o  year old RHD male who presents today for a follow up for his left ring finger laceration  Patient states that he was using electric merlyn, that they slipped and cut the flexor aspect of his distal 4th digit , 6/1/21  At his last visit, he was placed in finger stocks and [facility]  I have personally reviewed all the relevant PMH, PSH, SH, FH, Medications and allergies       PAST MEDICAL HISTORY:  Past Medical History:   Diagnosis Date    GERD (gastroesophageal reflux disease)     Hypertension     Impingement syndrome of right shoulder 10/3/2019    Seasonal allergies        PAST SURGICAL HISTORY:  Past Surgical History: Procedure Laterality Date    BACK SURGERY  2005    Laminectomy    KNEE SURGERY Right 1994       FAMILY HISTORY:  Family History   Problem Relation Age of Onset    Heart disease Mother     Heart disease Father     Stroke Father     Aortic stenosis Brother         Aortic valve replacement, carotid stenosis       SOCIAL HISTORY:  Social History     Tobacco Use    Smoking status: Former Smoker     Years: 10 00     Types: Cigarettes     Quit date: 2013     Years since quittin 4    Smokeless tobacco: Former User     Types: Chew    Tobacco comment: 1 cigarette per week for approximately 10 years   Vaping Use    Vaping Use: Never used   Substance Use Topics    Alcohol use: Yes     Comment: social    Drug use: Never       MEDICATIONS:    Current Outpatient Medications:     amLODIPine (NORVASC) 5 mg tablet, Take 1 tablet (5 mg total) by mouth daily, Disp: 90 tablet, Rfl: 1    fexofenadine (ALLEGRA) 180 MG tablet, Take 90 mg by mouth daily, Disp: , Rfl:     lisinopril-hydrochlorothiazide (PRINZIDE,ZESTORETIC) 10-12 5 MG per tablet, Take 1 tablet by mouth daily, Disp: 90 tablet, Rfl: 1    sildenafil (VIAGRA) 100 mg tablet, TAKE ONE TABLET BY MOUTH DAILY AS NEEDED FOR ERECTILE DYSFUNCTION, Disp: 10 tablet, Rfl: 0    ALLERGIES:  Allergies   Allergen Reactions    Pollen Extract      Plants       REVIEW OF SYSTEMS:  Review of Systems   Constitutional: Negative for chills, fever and unexpected weight change  HENT: Negative for hearing loss, nosebleeds and sore throat  Eyes: Negative for pain, redness and visual disturbance  Respiratory: Negative for cough, shortness of breath and wheezing  Cardiovascular: Negative for chest pain, palpitations and leg swelling  Gastrointestinal: Negative for abdominal pain, nausea and vomiting  Endocrine: Negative for polydipsia and polyuria  Genitourinary: Negative for dysuria and hematuria  Musculoskeletal: Positive for joint swelling     Skin: Positive for wound  Negative for rash  Neurological: Negative for dizziness, light-headedness and headaches  Psychiatric/Behavioral: Negative for decreased concentration, dysphoric mood and suicidal ideas  The patient is not nervous/anxious  VITALS:  Vitals:    06/30/21 1740   BP: 117/81   Pulse: 72   Resp: 17       LABS:  HgA1c:   Lab Results   Component Value Date    HGBA1C 5 4 05/19/2021     BMP:   Lab Results   Component Value Date    CALCIUM 9 3 05/18/2021    K 4 5 05/18/2021    CO2 32 05/18/2021    CL 99 05/18/2021    BUN 17 05/18/2021    CREATININE 0 91 05/18/2021       _____________________________________________________  PHYSICAL EXAMINATION:  General: well developed and well nourished, alert, oriented times 3 and appears comfortable  Psychiatric: Normal  HEENT: Normocephalic, Atraumatic Trachea Midline, No torticollis  Pulmonary: No audible wheezing or respiratory distress   Cardiovascular: No pitting edema, 2+ radial pulse   Abdominal/GI: abdomen non tender, non distended   Skin: No masses, erythema, lacerations, fluctation, ulcerations  Neurovascular: Sensation Intact to the Median, Ulnar, Radial Nerve, Motor Intact to the Median, Ulnar, Radial Nerve and Pulses Intact  Musculoskeletal: Normal, except as noted in detailed exam and in HPI        MUSCULOSKELETAL EXAMINATION:  Left hand:   Mild swelling of the ring finger    Well-healed laceration is circumferential from the ulnar side of the ring finger to the pad  Nail matrix is intact  +2 ring finger form distal moore crease  FDS & FDP are intact  Sensation is intact to light touch  Brisk capillary refill     ___________________________________________________  STUDIES REVIEWED:   no images reviewed at today's visit    PROCEDURES PERFORMED:  Procedures  No Procedures performed today    _____________________________________________________      Clifford Eddy    I,:  Adrián Spaulding am acting as a scribe while in the presence of the attending physician :       I,:  Patrizia Holt MD personally performed the services described in this documentation    as scribed in my presence :

## 2021-07-01 ENCOUNTER — APPOINTMENT (OUTPATIENT)
Dept: PHYSICAL THERAPY | Facility: OTHER | Age: 56
End: 2021-07-01
Payer: COMMERCIAL

## 2021-07-06 ENCOUNTER — OFFICE VISIT (OUTPATIENT)
Dept: PHYSICAL THERAPY | Facility: OTHER | Age: 56
End: 2021-07-06
Payer: COMMERCIAL

## 2021-07-06 DIAGNOSIS — G89.29 CHRONIC PAIN OF RIGHT KNEE: Primary | ICD-10-CM

## 2021-07-06 DIAGNOSIS — M25.561 CHRONIC PAIN OF RIGHT KNEE: Primary | ICD-10-CM

## 2021-07-06 PROCEDURE — 97112 NEUROMUSCULAR REEDUCATION: CPT | Performed by: PHYSICAL THERAPIST

## 2021-07-06 PROCEDURE — 97140 MANUAL THERAPY 1/> REGIONS: CPT | Performed by: PHYSICAL THERAPIST

## 2021-07-06 PROCEDURE — 97110 THERAPEUTIC EXERCISES: CPT | Performed by: PHYSICAL THERAPIST

## 2021-07-06 NOTE — PROGRESS NOTES
Daily Note     Today's date: 2021  Patient name: Tobi Fitch  : 1965  MRN: [id number]  Referring provider: Leydi Pang MD  Dx:   Encounter Diagnosis     ICD-10-CM    1  Chronic pain of right knee  M25 561     G89 29      1 on 1 entire duration   Start Time: 1100  Stop Time: 1215  Total time in clinic (min): 75 minutes    Subjective: Patient reports minimal pain at the conclusion of today's session  Objective: See treatment diary below    Assessment: Tolerated treatment well  Patient demonstrated fatigue post treatment, exhibited good technique with therapeutic exercises and would benefit from continued PT    Plan: Continue per plan of care       Precautions: complex PMH     Daily Treatment Log   Manuals     SI Evaluation [facility] 10'        SI Correction [facility] 5'        FR  [facility]    Patellar Mobs [facility]    Tibial Mobs [facility]    MFDc   [facility] 15'   [facility]                            Neuro Re-Ed      Prone GS  30 x 5"        PBKHE 20 x 5" ea       SLR Flexion  2 x 10   #2 3 x 10 ea SLR x 4 #5  3 x 10 ea    Clamshells  20 x 5" ea 20 x 5"   GTB 30 x 5" ea    MF Twist   Hu #15  3 x 10     MF Press GTB 30x ea  Hu #15  3 x 10     MF Walkout   Hu #15  5x ea     HS Isometrics  2 x 10 x 5" ea      Gabonese Squat    #30 3Julene Lusher LAQ    #30 3'                     Ther Ex     Bike   12'  10'  Curve 10'     Slantboard  10 x 10" ea   Gastroc  Soleus 10 x 10" ea  Gastroc   Soleus 10 x 10" ea  Gastroc   Soleus    Leg Press DL #30 30x   SL Ecc #30 30x  DL #30 5'      HR  30x        TR 30x        LAQ     GTB 30x                                     Ther Activity                        Gait Training                        Modalities

## 2021-07-07 ENCOUNTER — VBI (OUTPATIENT)
Dept: ADMINISTRATIVE | Facility: OTHER | Age: 56
End: 2021-07-07

## 2021-07-08 ENCOUNTER — APPOINTMENT (OUTPATIENT)
Dept: PHYSICAL THERAPY | Facility: OTHER | Age: 56
End: 2021-07-08
Payer: COMMERCIAL

## 2021-07-12 ENCOUNTER — OFFICE VISIT (OUTPATIENT)
Dept: PHYSICAL THERAPY | Facility: OTHER | Age: 56
End: 2021-07-12
Payer: COMMERCIAL

## 2021-07-12 DIAGNOSIS — G89.29 CHRONIC PAIN OF RIGHT KNEE: Primary | ICD-10-CM

## 2021-07-12 DIAGNOSIS — M25.561 CHRONIC PAIN OF RIGHT KNEE: Primary | ICD-10-CM

## 2021-07-12 PROCEDURE — 97110 THERAPEUTIC EXERCISES: CPT | Performed by: PHYSICAL THERAPIST

## 2021-07-12 PROCEDURE — 97140 MANUAL THERAPY 1/> REGIONS: CPT | Performed by: PHYSICAL THERAPIST

## 2021-07-12 PROCEDURE — 97112 NEUROMUSCULAR REEDUCATION: CPT | Performed by: PHYSICAL THERAPIST

## 2021-07-14 ENCOUNTER — OFFICE VISIT (OUTPATIENT)
Dept: PHYSICAL THERAPY | Facility: OTHER | Age: 56
End: 2021-07-14
Payer: COMMERCIAL

## 2021-07-14 DIAGNOSIS — G89.29 CHRONIC PAIN OF RIGHT KNEE: Primary | ICD-10-CM

## 2021-07-14 DIAGNOSIS — M25.561 CHRONIC PAIN OF RIGHT KNEE: Primary | ICD-10-CM

## 2021-07-14 PROCEDURE — 97112 NEUROMUSCULAR REEDUCATION: CPT | Performed by: PHYSICAL THERAPIST

## 2021-07-14 PROCEDURE — 97140 MANUAL THERAPY 1/> REGIONS: CPT | Performed by: PHYSICAL THERAPIST

## 2021-07-14 PROCEDURE — 97110 THERAPEUTIC EXERCISES: CPT | Performed by: PHYSICAL THERAPIST

## 2021-07-14 NOTE — LETTER
2021    MD Emir [address]    Patient: Deon Murrell   YOB: 1965   Date of Visit: 2021     Encounter Diagnosis     ICD-10-CM    1  Chronic pain of right knee  M25 561     G89 29        Dear Dr Germania Hernandez: Thank you for your recent referral of Deon Murrell  Please review the attached evaluation summary from Yovany's recent visit  Please verify that you agree with the plan of care by signing the attached order  If you have any questions or concerns, please do not hesitate to call  I sincerely appreciate the opportunity to share in the care of one of your patients and hope to have another opportunity to work with you in the near future  Sincerely,    Mariah Boggs, PT      Referring Provider:      I certify that I have read the below Plan of Care and certify the need for these services furnished under this plan of treatment while under my care  MD Emir Tyler [place] [address]  [address] [place]          PT Re-Evaluation     Today's date: 2021  Patient name: Deon Murrell  : 1965  MRN: [id number]  Referring provider: Clarice Dowell   Dx:   Encounter Diagnosis     ICD-10-CM    1  Chronic pain of right knee  M25 561     G89 29        Start Time: 1145  Stop Time: 1230  Total time in clinic (min): 45 minutes    Assessment  Assessment details: Deon Murrell is a 63 y o  male who presents with complaints of chronic pain of right knee  (primary encounter diagnosis)  No further referral appears necessary at this time based upon examination results  Patient presents to PT with impaired strength, impaired ROM, decreased flexibility and impaired ability to complete IADLs  Prognosis is good given HEP compliance and PT 2-3x/wk  Positive prognostic indicators include positive attitude toward recovery  Please contact me if you have any questions or recommendations  Thank you for the opportunity to share in  [facility] care  Impairments: abnormal coordination, abnormal muscle firing, abnormal or restricted ROM, activity intolerance, impaired physical strength, lacks appropriate home exercise program, pain with function and poor body mechanics    Symptom irritability: moderateBarriers to therapy: Complex PMH   Understanding of Dx/Px/POC: good   Prognosis: good    Goals  Short Term:  Pt will report decreased levels of pain by at least 2 subjective ratings in 4 weeks- ONGOING  Pt will demonstrate improved ROM by at least 10 degrees in 4 weeks- ONGOING  Pt will demonstrate improved strength by 1/2 grade- ONGOING  Long Term:   Pt will be independent in their HEP in 8 weeks- ONGOING  Pt will be be pain free with IADL's- ONGOING  Pt will demonstrate improved FOTO, > 80- ONGOING    Plan  Plan details: Prognosis above is given PT services 2x/week tapering to 1x/week over the next 3 months and home program adherence  Patient would benefit from: skilled physical therapy  Planned modality interventions: thermotherapy: hydrocollator packs, cryotherapy, electrical stimulation/Russian stimulation and low level laser therapy  Planned therapy interventions: activity modification, joint mobilization, manual therapy, motor coordination training, neuromuscular re-education, patient education, self care, therapeutic activities, therapeutic exercise, graded activity, home exercise program, balance, strengthening, stretching, flexibility and functional ROM exercises  Frequency: 2x week  Plan of Care beginning date: 7/14/2021  Plan of Care expiration date: 10/14/2021  Treatment plan discussed with: patient    Subjective Evaluation    History of Present Illness  Mechanism of injury: Since Mother's Day, he has been dealing with the knee pain  He said that it gets better and then worse  Patient said that his knee really bothers him when he negotiates steps    He also said that he feels pain if his knees touch each other while sleeping at night  Patient said that when he was working for 2 hours as a  the next day he had pain  Patient is frustrated  However he is wants to know he should see for this issue- sports medicine or an orthopedic  Pain   R Knee   Currently 0/10  At Best 0/10  At Worst 8/10  Patient described the pain as a sharp stabbing sensation accompanied by locking of the R knee      AGGS: walking for an extended period of time, sitting for an extended period of time, steps   EASES: ice  Patient's Goal: "I want to know who I should consult next "      Objective     General Comments:    Knee Comments  LQS: WNL     ROM:   WNL     Special Tests:   Anterior Drawer -   Posterior Drawer -   Lachman - but no clunk   Eduardo +   Valgus -  Varus -   Patellar Compression Test -     Segmental Mobility:   Hypomobile patella in all planes  Hypomobile Tibial Glides in all planes including rotation     Observation: swelling noted in the disolateral component of the IT band     Palpation: hard mass in the distolateral component of the IT band    Precautions: complex PMH     Daily Treatment Log   Manuals 6/17 6/24 6/28 7/1 7/12 7/14   SI Evaluation [facility] 10'         SI Correction [facility] 5'         FR  [facility]   Patellar Mobs [facility]   Tibial Mobs [facility]   MFDc   [facility] 15'   [facility]                              Neuro Re-Ed  6/17 6/24 6/28 7/1 7/12 7/14   Prone GS  30 x 5"         PBKHE 20 x 5" ea        SLR Flexion  2 x 10   #2 3 x 10 ea SLR x 4 #5  3 x 10 ea     Clamshells  20 x 5" ea 20 x 5"   GTB 30 x 5" ea GTB 30 x 5" ea GTB 30 x 5" ea   MF Twist   [place] #15  3 x 10      MF Press GTB 30x ea  Hu #15  3 x 10      MF Walkout   Uh #15  5x ea      HS Isometrics  2 x 10 x 5" ea       [place] Squat    #30 3'      Unweight LAQ    #30 3'      Hip Add Iso     30 x 5" ea 30 x 5" ea   PBall Crushers     30 x 5" ea 30 x 5" ea   PBall Diagonal 30 x 5" ea 30 x 5" ea                     Ther Ex 6/17 6/24 6/28 7/1 7/12 7/14   Bike   12'  10'  Curve 10'  10'  10'    Slantboard  10 x 10" ea   Gastroc  Soleus 10 x 10" ea  Gastroc   Soleus 10 x 10" ea  Gastroc   Soleus 10 x 10" ea   Gastroc  Soleus 10 x 10" ea  Gastroc  Soleus   Leg Press DL #30 30x   SL Ecc #30 30x  DL #30 5'       HR  30x         TR 30x         LAQ     GTB 30x  #5 3 x 10 x 5" ea #5 3 x 10 x 5" ea   SHC     #5 3 x 10 x 5" ea #5 3 x 10 x 5" ea                              Ther Activity                           Gait Training                           Modalities

## 2021-07-15 ENCOUNTER — APPOINTMENT (OUTPATIENT)
Dept: PHYSICAL THERAPY | Facility: OTHER | Age: 56
End: 2021-07-15
Payer: COMMERCIAL

## 2021-07-15 NOTE — PROGRESS NOTES
Daily Note     Today's date: 2021  Patient name: Gayle Jose  : 1965  MRN: [id number]  Referring provider: Melody Morrison MD  Dx:   Encounter Diagnosis     ICD-10-CM    1  Chronic pain of right knee  M25 561     G89 29      1 on 1 entire duration   Start Time: 1100  Stop Time: 1145  Total time in clinic (min): 45 minutes    Subjective: Patient reports minimal pain prior to the start of today's session  At the end of the session, he denied any pain  Objective: See treatment diary below    Assessment: Tolerated treatment well  Patient demonstrated fatigue post treatment, exhibited good technique with therapeutic exercises and would benefit from continued PT    Plan: Continue per plan of care       Precautions: complex PMH     Daily Treatment Log   Manuals    SI Evaluation [facility] 10'        SI Correction [facility] 5'        FR  [facility]   Patellar Mobs [facility]   Tibial Mobs [facility]   MFDc   [facility] 15'   [facility]                           Neuro Re-Ed     Prone GS  30 x 5"        PBKHE 20 x 5" ea       SLR Flexion  2 x 10   #2 3 x 10 ea SLR x 4 #5  3 x 10 ea    Clamshells  20 x 5" ea 20 x 5"   GTB 30 x 5" ea GTB 30 x 5" ea   MF Twist   Hu #15  3 x 10     MF Press GTB 30x ea  Hu #15  3 x 10     MF Walkout   [place] #15  5x ea     HS Isometrics  2 x 10 x 5" ea      Telugu Squat    #30 3'     Unweight LAQ    #30 3'     Hip Add Iso     30 x 5" ea   PBall Crushers     30 x 5" ea   PBall Diagonal     30 x 5" ea                   Ther Ex    Bike   12'  10'  Curve 10'  10'    Slantboard  10 x 10" ea   Gastroc  Soleus 10 x 10" ea  Gastroc   Soleus 10 x 10" ea  Gastroc   Soleus 10 x 10" ea   Gastroc  Soleus   Leg Press DL #30 30x   SL Ecc #30 30x  DL #30 5'      HR  30x        TR 30x        LAQ     GTB 30x  #5 3 x 10 x 5" ea   [address]     #5 3 x 10 x 5" ea                           Ther Activity Gait Training                        Modalities

## 2021-07-16 DIAGNOSIS — N52.9 ERECTILE DYSFUNCTION, UNSPECIFIED ERECTILE DYSFUNCTION TYPE: ICD-10-CM

## 2021-07-16 DIAGNOSIS — I10 ESSENTIAL HYPERTENSION: ICD-10-CM

## 2021-07-16 DIAGNOSIS — E78.2 MIXED HYPERLIPIDEMIA: ICD-10-CM

## 2021-07-16 RX ORDER — SILDENAFIL 100 MG/1
TABLET, FILM COATED ORAL
Qty: 10 TABLET | Refills: 0 | Status: SHIPPED | OUTPATIENT
Start: 2021-07-16 | End: 2021-10-15

## 2021-07-16 NOTE — PROGRESS NOTES
PT Re-Evaluation     Today's date: 2021  Patient name: Felipa Paniagua  : 1965  MRN: [id number]  Referring provider: Grace Pickens   Dx:   Encounter Diagnosis     ICD-10-CM    1  Chronic pain of right knee  M25 561     G89 29        Start Time: 1145  Stop Time: 1230  Total time in clinic (min): 45 minutes    Assessment  Assessment details: Felipa Paniagua is a 63 y o  male who presents with complaints of chronic pain of right knee  (primary encounter diagnosis)  No further referral appears necessary at this time based upon examination results  Patient presents to PT with impaired strength, impaired ROM, decreased flexibility and impaired ability to complete IADLs  Prognosis is good given HEP compliance and PT 2-3x/wk  Positive prognostic indicators include positive attitude toward recovery  Please contact me if you have any questions or recommendations  Thank you for the opportunity to share in  [facility] care  Impairments: abnormal coordination, abnormal muscle firing, abnormal or restricted ROM, activity intolerance, impaired physical strength, lacks appropriate home exercise program, pain with function and poor body mechanics    Symptom irritability: moderateBarriers to therapy: Complex PMH   Understanding of Dx/Px/POC: good   Prognosis: good    Goals  Short Term:  Pt will report decreased levels of pain by at least 2 subjective ratings in 4 weeks- ONGOING  Pt will demonstrate improved ROM by at least 10 degrees in 4 weeks- ONGOING  Pt will demonstrate improved strength by 1/2 grade- ONGOING  Long Term:   Pt will be independent in their HEP in 8 weeks- ONGOING  Pt will be be pain free with IADL's- ONGOING  Pt will demonstrate improved FOTO, > 80- ONGOING    Plan  Plan details: Prognosis above is given PT services 2x/week tapering to 1x/week over the next 3 months and home program adherence    Patient would benefit from: skilled physical therapy  Planned modality interventions: thermotherapy: hydrocollator packs, cryotherapy, electrical stimulation/Russian stimulation and low level laser therapy  Planned therapy interventions: activity modification, joint mobilization, manual therapy, motor coordination training, neuromuscular re-education, patient education, self care, therapeutic activities, therapeutic exercise, graded activity, home exercise program, balance, strengthening, stretching, flexibility and functional ROM exercises  Frequency: 2x week  Plan of Care beginning date: 7/14/2021  Plan of Care expiration date: 10/14/2021  Treatment plan discussed with: patient    Subjective Evaluation    History of Present Illness  Mechanism of injury: Since Mother's Day, he has been dealing with the knee pain  He said that it gets better and then worse  Patient said that his knee really bothers him when he negotiates steps  He also said that he feels pain if his knees touch each other while sleeping at night  Patient said that when he was working for 2 hours as a  the next day he had pain  Patient is frustrated  However he is wants to know he should see for this issue- sports medicine or an orthopedic  Pain   R Knee   Currently 0/10  At Best 0/10  At Worst 8/10  Patient described the pain as a sharp stabbing sensation accompanied by locking of the R knee      AGGS: walking for an extended period of time, sitting for an extended period of time, steps   EASES: ice  Patient's Goal: "I want to know who I should consult next "      Objective     General Comments:    Knee Comments  LQS: WNL     ROM:   WNL     Special Tests:   Anterior Drawer -   Posterior Drawer -   Lachman - but no clunk   Eduardo +   Valgus -  Varus -   Patellar Compression Test -     Segmental Mobility:   Hypomobile patella in all planes  Hypomobile Tibial Glides in all planes including rotation     Observation: swelling noted in the disolateral component of the IT band     Palpation: hard mass in the distolateral component of the IT band    Precautions: complex PMH     Daily Treatment Log   Manuals 6/17 6/24 6/28 7/1 7/12 7/14   SI Evaluation [facility] 10'         SI Correction [facility] 5'         FR  [facility]   Patellar Mobs [facility]   Tibial Mobs [facility]   MFDc   [facility] 15'   [facility]                              Neuro Re-Ed  6/17 6/24 6/28 7/1 7/12 7/14   Prone GS  30 x 5"         PBKHE 20 x 5" ea        SLR Flexion  2 x 10   #2 3 x 10 ea SLR x 4 #5  3 x 10 ea     Clamshells  20 x 5" ea 20 x 5"   GTB 30 x 5" ea GTB 30 x 5" ea GTB 30 x 5" ea   MF Twist   Hu #15  3 x 10      MF Press GTB 30x ea  [place] #15  3 x 10      MF Walkout   [place] #15  5x ea      HS Isometrics  2 x 10 x 5" ea       [place] Squat    #30 3'      Unweight LAQ    #30 3'      Hip Add Iso     30 x 5" ea 30 x 5" ea   PBall Crushers     30 x 5" ea 30 x 5" ea   PBall Diagonal     30 x 5" ea 30 x 5" ea                     Ther Ex 6/17 6/24 6/28 7/1 7/12 7/14   Bike   12'  10'  Curve 10'  10'  10'    Slantboard  10 x 10" ea   Gastroc  Soleus 10 x 10" ea  Gastroc   Soleus 10 x 10" ea  Gastroc   Soleus 10 x 10" ea   Gastroc  Soleus 10 x 10" ea  Gastroc  Soleus   Leg Press DL #30 30x   SL Ecc #30 30x  DL #30 5'       HR  30x         TR 30x         LAQ     GTB 30x  #5 3 x 10 x 5" ea #5 3 x 10 x 5" ea   [address]     #5 3 x 10 x 5" ea #5 3 x 10 x 5" ea                              Ther Activity                           Gait Training                           Modalities

## 2021-07-19 ENCOUNTER — OFFICE VISIT (OUTPATIENT)
Dept: PHYSICAL THERAPY | Facility: OTHER | Age: 56
End: 2021-07-19
Payer: COMMERCIAL

## 2021-07-19 DIAGNOSIS — M25.561 CHRONIC PAIN OF RIGHT KNEE: Primary | ICD-10-CM

## 2021-07-19 DIAGNOSIS — G89.29 CHRONIC PAIN OF RIGHT KNEE: Primary | ICD-10-CM

## 2021-07-19 PROCEDURE — 97112 NEUROMUSCULAR REEDUCATION: CPT | Performed by: PEDIATRICS

## 2021-07-19 PROCEDURE — 97140 MANUAL THERAPY 1/> REGIONS: CPT | Performed by: PEDIATRICS

## 2021-07-19 PROCEDURE — 97110 THERAPEUTIC EXERCISES: CPT | Performed by: PEDIATRICS

## 2021-07-19 NOTE — PROGRESS NOTES
Daily Note     Today's date: 2021  Patient name: Yasmani Lawson  : 1965  MRN: [id number]  Referring provider: Sreedhar Read*  Dx:   Encounter Diagnosis     ICD-10-CM    1  Chronic pain of right knee  M25 561     G89 29                   Subjective: Patient reports feeling pretty good today  States he did not go for a run like he planned to  States he will try to get a run in before next treatment session  Objective: See treatment diary below      Assessment: Tolerated treatment well  Patient demonstrated fatigue post treatment, exhibited good technique with therapeutic exercises and would benefit from continued PT      Plan: Continue per plan of care        Precautions: complex PMH     Daily Treatment Log   Manuals    SI Evaluation         SI Correction         FR  [address] [facility]    Patellar Mobs [facility]   [facility]  [facility] 15'   [facility] NP                              Neuro Re-Ed     Prone GS          PBKHE         SLR Flexion   #2 3 x 10 ea SLR x 4 #5  3 x 10 ea      Clamshells  20 x 5"   GTB 30 x 5" ea GTB 30 x 5" ea GTB 30 x 5" ea GTB 30 x 5" ea   MF Twist  Hu #15  3 x 10       MF Press  Hu #15  3 x 10       MF Walkout  Hu #15  5x ea       HS Isometrics 2 x 10 x 5" ea        [place] Squat   #30 3'       Unweight LAQ   #30 3'       Hip Add Iso    30 x 5" ea 30 x 5" ea 30x5"   PBall Crushers    30 x 5" ea 30 x 5" ea 30x5"   PBall Diagonal    30 x 5" ea 30 x 5" ea 30x5"                     Ther Ex    Bike  12'  10'  Curve 10'  10'  10'  10'   Slantboard 10 x 10" ea   Gastroc  Soleus 10 x 10" ea  Gastroc   Soleus 10 x 10" ea  Gastroc   Soleus 10 x 10" ea   Gastroc  Soleus 10 x 10" ea  Gastroc  Soleus 10 x 10" ea  Gastroc  Soleus   Leg Press  DL #30 5'        HR          TR         LAQ    GTB 30x  #5 3 x 10 x 5" ea #5 3 x 10 x 5" ea #5 3 x 10 x 5" ea   [address]    #5 3 x 10 x 5" ea #5 3 x 10 x 5" ea #5 3 x 10 x 5" ea                              Ther Activity                           Gait Training                           Modalities

## 2021-07-21 ENCOUNTER — OFFICE VISIT (OUTPATIENT)
Dept: PHYSICAL THERAPY | Facility: OTHER | Age: 56
End: 2021-07-21
Payer: COMMERCIAL

## 2021-07-21 DIAGNOSIS — M25.561 CHRONIC PAIN OF RIGHT KNEE: Primary | ICD-10-CM

## 2021-07-21 DIAGNOSIS — G89.29 CHRONIC PAIN OF RIGHT KNEE: Primary | ICD-10-CM

## 2021-07-21 PROCEDURE — 97112 NEUROMUSCULAR REEDUCATION: CPT | Performed by: PHYSICAL THERAPIST

## 2021-07-21 PROCEDURE — 97110 THERAPEUTIC EXERCISES: CPT | Performed by: PHYSICAL THERAPIST

## 2021-07-21 PROCEDURE — 97140 MANUAL THERAPY 1/> REGIONS: CPT | Performed by: PHYSICAL THERAPIST

## 2021-07-24 NOTE — PROGRESS NOTES
Daily Note     Today's date: 2021  Patient name: Campbell Luo  : 1965  MRN: [id number]  Referring provider: Cherie Reich*  Dx:   Encounter Diagnosis     ICD-10-CM    1  Chronic pain of right knee  M25 561     G89 29      IEP [id number]  1 on 1 [id number]  Start Time: 1015  Stop Time: 1100  Total time in clinic (min): 45 minutes    Subjective: Patient reports feeling pretty good today  Patient ran for 6/10 of a mile  He reports no pain  Objective: See treatment diary below    Assessment: Tolerated treatment well  Patient demonstrated fatigue post treatment, exhibited good technique with therapeutic exercises and would benefit from continued PT    Plan: Continue per plan of care        Precautions: complex PMH     Daily Treatment Log   Manuals    SI Evaluation         SI Correction         FR  [address] [facility] 1[address] [facility]   Patellar Mobs [facility]   Tibial Mobs [facility]   MFDc   [facility] NP                               Neuro Re-Ed     Prone GS          PBKHE         SLR Flexion  #2 3 x 10 ea SLR x 4 #5  3 x 10 ea    SLR x 4 #5   3 x 10 ea   Clamshells   GTB 30 x 5" ea GTB 30 x 5" ea GTB 30 x 5" ea GTB 30 x 5" ea GTB 30 x 5" ea   MF Twist [place] #15  3 x 10        MF Press [place] #15  3 x 10        MF Walkout [place] #15  5x ea        HS Isometrics         [place] Squat  #30 3'        Unweight LAQ  #30 3'        Hip Add Iso   30 x 5" ea 30 x 5" ea 30x5" 30 x 5"    PBall Crushers   30 x 5" ea 30 x 5" ea 30x5" 30 x 5"    PBall Diagonal   30 x 5" ea 30 x 5" ea 30x5" 30 x 5"                      Ther Ex 21   Bike  10'  Curve 10'  10'  10'  10' 10'    Slantboard 10 x 10" ea  Gastroc   Soleus 10 x 10" ea  Gastroc   Soleus 10 x 10" ea   Gastroc  Soleus 10 x 10" ea  Gastroc  Soleus 10 x 10" ea  Gastroc  Soleus 10 x 10" ea  Gastroc  Soleus   Leg Press DL #30 5'         HR          TR LAQ   GTB 30x  #5 3 x 10 x 5" ea #5 3 x 10 x 5" ea #5 3 x 10 x 5" ea GTB/#5  3 x 10 x 5" ea   [address]   #5 3 x 10 x 5" ea #5 3 x 10 x 5" ea #5 3 x 10 x 5" ea #6 3 x 10 x 5" ea   Resisted SS      MTB 2x 10x    Resisted MW      MTB 2x 10x            Ther Activity                           Gait Training                           Modalities

## 2021-07-26 ENCOUNTER — OFFICE VISIT (OUTPATIENT)
Dept: PHYSICAL THERAPY | Facility: OTHER | Age: 56
End: 2021-07-26
Payer: COMMERCIAL

## 2021-07-26 DIAGNOSIS — G89.29 CHRONIC PAIN OF RIGHT KNEE: Primary | ICD-10-CM

## 2021-07-26 DIAGNOSIS — M25.561 CHRONIC PAIN OF RIGHT KNEE: Primary | ICD-10-CM

## 2021-07-26 PROCEDURE — 97112 NEUROMUSCULAR REEDUCATION: CPT | Performed by: PEDIATRICS

## 2021-07-26 PROCEDURE — 97140 MANUAL THERAPY 1/> REGIONS: CPT | Performed by: PEDIATRICS

## 2021-07-26 PROCEDURE — 97110 THERAPEUTIC EXERCISES: CPT | Performed by: PEDIATRICS

## 2021-07-26 NOTE — PROGRESS NOTES
Daily Note     Today's date: 2021  Patient name: Jailene Minor  : 1965  MRN: [id number]  Referring provider: Luana Wiley  Dx:   Encounter Diagnosis     ICD-10-CM    1  Chronic pain of right knee  M25 561     G89 29                   Subjective: Patient reports feeling pretty good today  States he has no pain at start of treatment session  Does report mild increased medial knee pain with first steps of monster walks  Able to stretch and resolve pain  Objective: See treatment diary below    Assessment: Tolerated treatment well  Cuing required to maintain quad activation during SLR ex  Patient demonstrated fatigue post treatment, exhibited good technique with therapeutic exercises and would benefit from continued PT    Plan: Continue per plan of care        Precautions: complex PMH     Daily Treatment Log   Manuals    SI Evaluation        SI Correction        FR  [address] [facility] deferred   Patellar Mobs [facility] EW   Tibial Mobs [facility]    MFDc  [facility] NP                             Neuro Re-Ed     Prone GS         PBKHE        SLR Flexion     SLR x 4 #5   3 x 10 ea SLR x 4 #5   3 x 10 ea   Clamshells  GTB 30 x 5" ea GTB 30 x 5" ea GTB 30 x 5" ea GTB 30 x 5" ea    MF Twist        MF Press        MF Walkout        HS Isometrics        English Squat        Unweight LAQ        Hip Add Iso 30 x 5" ea 30 x 5" ea 30x5" 30 x 5"  30x5"   PBall Crushers 30 x 5" ea 30 x 5" ea 30x5" 30 x 5"  30x5"   PBall Diagonal 30 x 5" ea 30 x 5" ea 30x5" 30 x 5"  30x5"                   Ther Ex    Bike  10'  10'  10' 10'  10'   Slantboard 10 x 10" ea   Gastroc  Soleus 10 x 10" ea  Gastroc  Soleus 10 x 10" ea  Gastroc  Soleus 10 x 10" ea  Gastroc   Soleus 10 x 10" ea  Gastroc  Soleus   Leg Press        HR         TR        LAQ  #5 3 x 10 x 5" ea #5 3 x 10 x 5" ea #5 3 x 10 x 5" ea GTB/#5  3 x 10 x 5" ea GTB/#5  3 x 10 x 5" ea [address] #5 3 x 10 x 5" ea #5 3 x 10 x 5" ea #5 3 x 10 x 5" ea #6 3 x 10 x 5" ea #6 3 x 10 x 5" ea   Resisted SS    MTB 2x 10x  MTB 2x 10x    Resisted MW    MTB 2x 10x MTB 2x 10x            Ther Activity                        Gait Training                        Modalities

## 2021-07-28 ENCOUNTER — OFFICE VISIT (OUTPATIENT)
Dept: PHYSICAL THERAPY | Facility: OTHER | Age: 56
End: 2021-07-28
Payer: COMMERCIAL

## 2021-07-28 DIAGNOSIS — G89.29 CHRONIC PAIN OF RIGHT KNEE: Primary | ICD-10-CM

## 2021-07-28 DIAGNOSIS — M25.561 CHRONIC PAIN OF RIGHT KNEE: Primary | ICD-10-CM

## 2021-07-28 PROCEDURE — 97140 MANUAL THERAPY 1/> REGIONS: CPT | Performed by: PHYSICAL THERAPIST

## 2021-07-28 PROCEDURE — 97110 THERAPEUTIC EXERCISES: CPT | Performed by: PHYSICAL THERAPIST

## 2021-07-28 PROCEDURE — 97112 NEUROMUSCULAR REEDUCATION: CPT | Performed by: PHYSICAL THERAPIST

## 2021-07-28 NOTE — PROGRESS NOTES
Daily Note     Today's date: 21  Patient name: Chica Obando  : 1965  MRN: [id number]  Referring provider: Olena Graham*  Dx:   Encounter Diagnosis     ICD-10-CM    1  Chronic pain of right knee  M25 561     G89 29      1 on 1 entire duration   Start Time: 1115  Stop Time: 1200  Total time in clinic (min): 45 minutes    Subjective: Patient reports feeling pretty good today  He said he won't be able to come in until September because he is very busy the upcoming month  Objective: See treatment diary below    Assessment: Tolerated treatment well  Patient demonstrated fatigue post treatment, exhibited good technique with therapeutic exercises and would benefit from continued PT    Plan: Continue per plan of care        Precautions: complex PMH     Daily Treatment Log   Manuals    SI Evaluation         SI Correction         FR  [address] [facility] deferred    Patellar Mobs [facility] EW    Tibial Mobs [facility]     MFDc  [facility] NP                                 Neuro Re-Ed     Prone GS          PBKHE         SLR Flexion     SLR x 4 #5   3 x 10 ea SLR x 4 #5   3 x 10 ea SLR x 4 #5  3 x 10 ea   Clamshells  GTB 30 x 5" ea GTB 30 x 5" ea GTB 30 x 5" ea GTB 30 x 5" ea  GTB 30 x 5 "ea   MF Twist         MF Press         MF Walkout         HS Isometrics         Russian Squat         Unweight LAQ         Hip Add Iso 30 x 5" ea 30 x 5" ea 30x5" 30 x 5"  30x5" [place] 30 x 5"    PBall Crushers 30 x 5" ea 30 x 5" ea 30x5" 30 x 5"  30x5" 30 x 5"    PBall Diagonal 30 x 5" ea 30 x 5" ea 30x5" 30 x 5"  30x5" 30 x 5"                      Ther Ex    Bike  10'  10'  10' 10'  10' 10'    Slantboard 10 x 10" ea   Gastroc  Soleus 10 x 10" ea  Gastroc  Soleus 10 x 10" ea  Gastroc  Soleus 10 x 10" ea  Gastroc   Soleus 10 x 10" ea  Gastroc  Soleus 10 x 10" ea   Gastroc   Soleus   Leg Press         HR          TR LAQ  #5 3 x 10 x 5" ea #5 3 x 10 x 5" ea #5 3 x 10 x 5" ea GTB/#5  3 x 10 x 5" ea GTB/#5  3 x 10 x 5" ea GTB/#5  3 x 10 x 5" ea   [address] #5 3 x 10 x 5" ea #5 3 x 10 x 5" ea #5 3 x 10 x 5" ea #6 3 x 10 x 5" ea #6 3 x 10 x 5" ea #6 3 x 10 x 5" ea   Resisted SS    MTB 2x 10x  MTB 2x 10x  MTB 2x 10x   Resisted MW    MTB 2x 10x MTB 2x 10x  MTB 2x 10x             Ther Activity                           Gait Training                           Modalities

## 2021-08-02 ENCOUNTER — APPOINTMENT (OUTPATIENT)
Dept: PHYSICAL THERAPY | Facility: OTHER | Age: 56
End: 2021-08-02
Payer: COMMERCIAL

## 2021-08-04 ENCOUNTER — EVALUATION (OUTPATIENT)
Dept: PHYSICAL THERAPY | Facility: OTHER | Age: 56
End: 2021-08-04
Payer: COMMERCIAL

## 2021-08-04 DIAGNOSIS — G89.29 CHRONIC PAIN OF RIGHT KNEE: Primary | ICD-10-CM

## 2021-08-04 DIAGNOSIS — M25.561 CHRONIC PAIN OF RIGHT KNEE: Primary | ICD-10-CM

## 2021-08-04 PROCEDURE — 97140 MANUAL THERAPY 1/> REGIONS: CPT | Performed by: PHYSICAL THERAPIST

## 2021-08-04 PROCEDURE — 97112 NEUROMUSCULAR REEDUCATION: CPT | Performed by: PHYSICAL THERAPIST

## 2021-08-04 PROCEDURE — 97110 THERAPEUTIC EXERCISES: CPT | Performed by: PHYSICAL THERAPIST

## 2021-08-06 NOTE — PROGRESS NOTES
PT Re-Evaluation     Today's date: 2021  Patient name: Ivett Garcia  : 1965  MRN: [id number]  Referring provider: Davon Bower*  Dx:   Encounter Diagnosis     ICD-10-CM    1  Chronic pain of right knee  M25 561     G89 29        Start Time: 1100  Stop Time: 1200  Total time in clinic (min): 60 minutes    Assessment  Assessment details: Ivett Garcia is a 63 y o  male who presents with complaints of chronic pain of right knee  (primary encounter diagnosis)  No further referral appears necessary at this time based upon examination results  Patient presents to PT with improved strength, improved ROM, increased flexibility and ability to complete IADLs  Prognosis is good given HEP compliance and PT 2-3x/wk  Patient has been discharged at this time  Please contact me if you have any questions or recommendations  Thank you for the opportunity to share in  [facility] care  Impairments: abnormal coordination, abnormal muscle firing, abnormal or restricted ROM, activity intolerance, impaired physical strength, lacks appropriate home exercise program, pain with function and poor body mechanics    Symptom irritability: moderateBarriers to therapy: Complex PMH   Understanding of Dx/Px/POC: good   Prognosis: good    Goals  Short Term:  Pt will report decreased levels of pain by at least 2 subjective ratings in 4 weeks- MET  Pt will demonstrate improved ROM by at least 10 degrees in 4 weeks- MET  Pt will demonstrate improved strength by 1/2 grade- MET  Long Term:   Pt will be independent in their HEP in 8 weeks- PARTIALLY MET  Pt will be be pain free with IADL's- MET  Pt will demonstrate improved FOTO, > 80- MET        Plan  Plan details: Prognosis above is given PT services 2x/week tapering to 1x/week over the next 3 months and home program adherence    Patient would benefit from: skilled physical therapy  Planned modality interventions: thermotherapy: hydrocollator packs, cryotherapy, electrical stimulation/Russian stimulation and low level laser therapy  Planned therapy interventions: activity modification, joint mobilization, manual therapy, motor coordination training, neuromuscular re-education, patient education, self care, therapeutic activities, therapeutic exercise, graded activity, home exercise program, balance, strengthening, stretching, flexibility and functional ROM exercises  Frequency: N/A   Duration in weeks: N/A  Plan of Care beginning date: 8/4/2021  Plan of Care expiration date: 8/4/2021  Treatment plan discussed with: patient        Subjective Evaluation    History of Present Illness  Mechanism of injury: Patient is going to be very busy in the near future  He is unable to attend PT any more  However he is feeling a lot better  Patient said that overall he is feeling much stronger and is able to do more  He has tried running but does not do it on a regular basis because he is busy with other activities  Pain   R Knee   Currently 0/10  At Best 0/10  At Worst 0/10  Patient denies having pain at this time just occasional tightness       AGGS: excessive activity increases tightness  EASES: exercises  Patient's Goal: "I want to know who I should consult next "- MET      Objective     General Comments:      Knee Comments  LQS: WNL     ROM:   Flexion R 135 L 140  Extension R 4 L 0       Special Tests:   Anterior Drawer -   Posterior Drawer -   Lachman -    Eduardo -  Valgus -  Varus -   Patellar Compression Test -     Segmental Mobility:   Patella WNL in all planes  Tibial Glides WNL in all planes including rotation     Observation: swelling noted in the distolateral component of the IT band     Palpation: hard mass in the distolateral component of the IT band    Precautions: complex PMH     Daily Treatment Log  Manuals 8/4 7/14 7/19 7/21 7/26 7/28   SI Evaluation         SI Correction         FR  [address] [facility]  GRC deferred    Patellar Mobs [address] [facility] EW    Tibial Mobs [address] [facility]     [facility]                                 Neuro Re-Ed  8/4 7/14 7/19 7/21 7/26 7/28   Prone GS          PBKHE         SLR Flexion     SLR x 4 #5   3 x 10 ea SLR x 4 #5   3 x 10 ea SLR x 4 #5  3 x 10 ea   Clamshells  GTB 30 x 5" ea GTB 30 x 5" ea GTB 30 x 5" ea GTB 30 x 5" ea  GTB 30 x 5 "ea   MF Twist         MF Press         MF Walkout         HS Isometrics         Welsh Squat         Unweight LAQ         Hip Add Iso 30 x 5" ea 30 x 5" ea 30x5" 30 x 5"  30x5" Bridge 30 x 5"    PBall Crushers 30 x 5" ea 30 x 5" ea 30x5" 30 x 5"  30x5" 30 x 5"    PBall Diagonal 30 x 5" ea 30 x 5" ea 30x5" 30 x 5"  30x5" 30 x 5"                      Ther Ex 8/4 7/14 7/19 7/21 7/26 7/28   Bike  10'  10'  10' 10'  10' 10'    Slantboard 10 x 10" ea   Gastroc  Soleus 10 x 10" ea  Gastroc  Soleus 10 x 10" ea  Gastroc  Soleus 10 x 10" ea  Gastroc   Soleus 10 x 10" ea  Gastroc  Soleus 10 x 10" ea   Gastroc   Soleus   Leg Press         HR          TR         LAQ  #5 3 x 10 x 5" ea #5 3 x 10 x 5" ea #5 3 x 10 x 5" ea GTB/#5  3 x 10 x 5" ea GTB/#5  3 x 10 x 5" ea GTB/#5  3 x 10 x 5" ea   [address]5 3 x 10 x 5" ea #5 3 x 10 x 5" ea #5 3 x 10 x 5" ea #6 3 x 10 x 5" ea #6 3 x 10 x 5" ea #6 3 x 10 x 5" ea   Resisted SS    MTB 2x 10x  MTB 2x 10x  MTB 2x 10x   Resisted MW    MTB 2x 10x MTB 2x 10x  MTB 2x 10x             Ther Activity                           Gait Training                           Modalities

## 2021-10-15 DIAGNOSIS — I10 ESSENTIAL HYPERTENSION: ICD-10-CM

## 2021-10-15 DIAGNOSIS — N52.9 ERECTILE DYSFUNCTION, UNSPECIFIED ERECTILE DYSFUNCTION TYPE: ICD-10-CM

## 2021-10-15 DIAGNOSIS — E78.2 MIXED HYPERLIPIDEMIA: ICD-10-CM

## 2021-10-15 RX ORDER — SILDENAFIL 100 MG/1
TABLET, FILM COATED ORAL
Qty: 10 TABLET | Refills: 0 | Status: SHIPPED | OUTPATIENT
Start: 2021-10-15 | End: 2022-02-08 | Stop reason: SDUPTHER

## 2021-10-25 ENCOUNTER — IMMUNIZATIONS (OUTPATIENT)
Dept: FAMILY MEDICINE CLINIC | Facility: CLINIC | Age: 56
End: 2021-10-25

## 2021-10-25 DIAGNOSIS — Z23 ENCOUNTER FOR IMMUNIZATION: Primary | ICD-10-CM

## 2021-11-23 ENCOUNTER — AMB VIDEO VISIT (OUTPATIENT)
Dept: OTHER | Facility: HOSPITAL | Age: 56
End: 2021-11-23
Payer: COMMERCIAL

## 2021-11-23 DIAGNOSIS — J01.00 ACUTE NON-RECURRENT MAXILLARY SINUSITIS: Primary | ICD-10-CM

## 2021-11-23 PROBLEM — Z00.00 ENCOUNTER FOR PREVENTIVE HEALTH EXAMINATION: Status: RESOLVED | Noted: 2021-05-27 | Resolved: 2021-11-23

## 2021-11-23 PROCEDURE — 99212 OFFICE O/P EST SF 10 MIN: CPT | Performed by: PHYSICIAN ASSISTANT

## 2021-11-23 RX ORDER — AMOXICILLIN AND CLAVULANATE POTASSIUM 875; 125 MG/1; MG/1
1 TABLET, FILM COATED ORAL 2 TIMES DAILY
Qty: 20 TABLET | Refills: 0 | Status: SHIPPED | OUTPATIENT
Start: 2021-11-23 | End: 2021-12-03

## 2022-02-08 DIAGNOSIS — E78.2 MIXED HYPERLIPIDEMIA: ICD-10-CM

## 2022-02-08 DIAGNOSIS — I10 ESSENTIAL HYPERTENSION: ICD-10-CM

## 2022-02-08 DIAGNOSIS — N52.9 ERECTILE DYSFUNCTION, UNSPECIFIED ERECTILE DYSFUNCTION TYPE: ICD-10-CM

## 2022-02-08 RX ORDER — LISINOPRIL AND HYDROCHLOROTHIAZIDE 12.5; 1 MG/1; MG/1
1 TABLET ORAL DAILY
Qty: 90 TABLET | Refills: 1 | Status: SHIPPED | OUTPATIENT
Start: 2022-02-08

## 2022-02-08 RX ORDER — SILDENAFIL 100 MG/1
100 TABLET, FILM COATED ORAL DAILY PRN
Qty: 10 TABLET | Refills: 0 | Status: SHIPPED | OUTPATIENT
Start: 2022-02-08 | End: 2022-05-03 | Stop reason: SDUPTHER

## 2022-02-08 RX ORDER — AMLODIPINE BESYLATE 5 MG/1
5 TABLET ORAL DAILY
Qty: 90 TABLET | Refills: 1 | Status: SHIPPED | OUTPATIENT
Start: 2022-02-08

## 2022-03-29 ENCOUNTER — TELEMEDICINE (OUTPATIENT)
Dept: INTERNAL MEDICINE CLINIC | Facility: CLINIC | Age: 57
End: 2022-03-29
Payer: COMMERCIAL

## 2022-03-29 DIAGNOSIS — U07.1 COVID: Primary | ICD-10-CM

## 2022-03-29 PROCEDURE — 99213 OFFICE O/P EST LOW 20 MIN: CPT

## 2022-03-29 NOTE — PROGRESS NOTES
Assessment/Plan:    COVID  · Patient states that tested positive for COVID today  He has symptoms of cough, chills, runny nose since yesterday  Denies any sick contact or recent travel  States that no one else is sick at home  He states that he has been taking Tylenol that has been helping  · No history of pulmonary disease, COPD, immunodeficiency, malignancy    Plan:   · Conservative symptomatic management  Staying hydrated, Tylenol, OTC antitussive medication  · Close return precaution for shortness of breath, chest pain, fever       Diagnoses and all orders for this visit:    COVID          Subjective:      Patient ID: Demi Valladares is a 61 y o  male  HPI    Patient states that tested positive for COVID today  He has symptoms of cough, chills, runny nose since yesterday  Denies any sick contact or recent travel  States that no one else is sick at home  He states that he has been taking Tylenol that has been helping  No history of pulmonary disease, COPD, immunodeficiency, malignancy  Denies chest pain, palpitation, fever, shortness of breath, hemoptysis    The following portions of the patient's history were reviewed and updated as appropriate: allergies, past family history, past medical history, past social history, past surgical history and problem list     Review of Systems   Constitutional: Positive for chills  Negative for fever  HENT: Positive for postnasal drip, rhinorrhea and sneezing  Negative for ear pain and sore throat  Eyes: Negative for pain and visual disturbance  Respiratory: Positive for cough  Negative for chest tightness and shortness of breath  Cardiovascular: Negative for chest pain and palpitations  Gastrointestinal: Negative for abdominal pain, diarrhea and vomiting  Genitourinary: Negative for dysuria and hematuria  Musculoskeletal: Negative for arthralgias, back pain and neck pain  Skin: Negative for color change and rash     Neurological: Negative for dizziness, seizures, syncope and headaches  All other systems reviewed and are negative  Objective: There were no vitals taken for this visit           Physical Exam  tele visit

## 2022-03-29 NOTE — ASSESSMENT & PLAN NOTE
· Patient states that tested positive for COVID today  He has symptoms of cough, chills, runny nose since yesterday  Denies any sick contact or recent travel  States that no one else is sick at home  He states that he has been taking Tylenol that has been helping  · No history of pulmonary disease, COPD, immunodeficiency, malignancy    Plan:   · Conservative symptomatic management  Staying hydrated, Tylenol, OTC antitussive medication    · Close return precaution for shortness of breath, chest pain, fever

## 2022-05-03 ENCOUNTER — OFFICE VISIT (OUTPATIENT)
Dept: INTERNAL MEDICINE CLINIC | Facility: CLINIC | Age: 57
End: 2022-05-03
Payer: COMMERCIAL

## 2022-05-03 VITALS
WEIGHT: 247.6 LBS | TEMPERATURE: 98.1 F | DIASTOLIC BLOOD PRESSURE: 74 MMHG | HEIGHT: 70 IN | BODY MASS INDEX: 35.45 KG/M2 | SYSTOLIC BLOOD PRESSURE: 112 MMHG | HEART RATE: 80 BPM | OXYGEN SATURATION: 97 %

## 2022-05-03 DIAGNOSIS — N52.9 ERECTILE DYSFUNCTION, UNSPECIFIED ERECTILE DYSFUNCTION TYPE: ICD-10-CM

## 2022-05-03 DIAGNOSIS — I10 ESSENTIAL HYPERTENSION: ICD-10-CM

## 2022-05-03 DIAGNOSIS — Z00.00 ANNUAL PHYSICAL EXAM: ICD-10-CM

## 2022-05-03 DIAGNOSIS — E78.2 MIXED HYPERLIPIDEMIA: ICD-10-CM

## 2022-05-03 DIAGNOSIS — E66.09 CLASS 2 OBESITY DUE TO EXCESS CALORIES WITHOUT SERIOUS COMORBIDITY WITH BODY MASS INDEX (BMI) OF 35.0 TO 35.9 IN ADULT: Primary | ICD-10-CM

## 2022-05-03 PROBLEM — E66.812 CLASS 2 OBESITY IN ADULT: Status: ACTIVE | Noted: 2020-06-26

## 2022-05-03 PROBLEM — U07.1 COVID: Status: RESOLVED | Noted: 2022-03-29 | Resolved: 2022-05-03

## 2022-05-03 PROCEDURE — 99396 PREV VISIT EST AGE 40-64: CPT | Performed by: INTERNAL MEDICINE

## 2022-05-03 RX ORDER — SILDENAFIL 100 MG/1
100 TABLET, FILM COATED ORAL DAILY PRN
Qty: 30 TABLET | Refills: 0 | Status: SHIPPED | OUTPATIENT
Start: 2022-05-03

## 2022-05-03 NOTE — PROGRESS NOTES
Assessment/Plan:    Class 2 obesity in adult  Encouraged diet, calorie restriction and exercise  Erectile dysfunction  Sildenafil was renewed    Essential hypertension  Blood pressure is well controlled on current medications    Hyperlipidemia  Currently not requiring any medications  We will continue to monitor periodically through labs       Diagnoses and all orders for this visit:    Class 2 obesity due to excess calories without serious comorbidity with body mass index (BMI) of 35 0 to 35 9 in adult  -     CBC and Platelet; Future  -     Comprehensive metabolic panel; Future    Essential hypertension  -     sildenafil (VIAGRA) 100 mg tablet; Take 1 tablet (100 mg total) by mouth daily as needed for erectile dysfunction  -     CBC and Platelet; Future  -     Comprehensive metabolic panel; Future    Mixed hyperlipidemia  -     sildenafil (VIAGRA) 100 mg tablet; Take 1 tablet (100 mg total) by mouth daily as needed for erectile dysfunction  -     CBC and Platelet; Future  -     Comprehensive metabolic panel; Future    Erectile dysfunction, unspecified erectile dysfunction type  -     sildenafil (VIAGRA) 100 mg tablet; Take 1 tablet (100 mg total) by mouth daily as needed for erectile dysfunction          Subjective:      Patient ID: Wilver Thomas is a 61 y o  male  Patient presents to the office for an annual physical examination  He has no particular problems or complaints  He is feeling well  He has not had any labs done recently  His previous labs were reviewed briefly  He has a history of low HDL cholesterol and very mildly elevated LDL cholesterol but still less than 130  Total cholesterol was normal and triglycerides were within normal limits    He is currently on amlodipine and lisinopril/ hydrochlorothiazide and has not been experiencing any significant issues with hypotension, lightheadedness or dizziness      Family History   Problem Relation Age of Onset    Heart disease Mother     Heart attack Mother     Heart disease Father     Stroke Father     Heart attack Father     Aortic stenosis Brother         Aortic valve replacement, carotid stenosis     Social History     Socioeconomic History    Marital status: /Civil Union     Spouse name: Not on file    Number of children: Not on file    Years of education: Not on file    Highest education level: Not on file   Occupational History    Not on file   Tobacco Use    Smoking status: Former Smoker     Years: 10 00     Types: Cigarettes     Quit date: 2013     Years since quittin 3    Smokeless tobacco: Former User     Types: Chew    Tobacco comment: 1 cigarette per week for approximately 10 years   Vaping Use    Vaping Use: Never used   Substance and Sexual Activity    Alcohol use:  Yes     Alcohol/week: 2 0 standard drinks     Types: 2 Standard drinks or equivalent per week     Comment: social    Drug use: Never    Sexual activity: Not on file   Other Topics Concern    Not on file   Social History Narrative    Not on file     Social Determinants of Health     Financial Resource Strain: Not on file   Food Insecurity: Not on file   Transportation Needs: Not on file   Physical Activity: Not on file   Stress: Not on file   Social Connections: Not on file   Intimate Partner Violence: Not on file   Housing Stability: Not on file     Past Medical History:   Diagnosis Date    COVID 2022    GERD (gastroesophageal reflux disease)     Hypertension     Impingement syndrome of right shoulder 10/3/2019    Seasonal allergies        Current Outpatient Medications:     amLODIPine (NORVASC) 5 mg tablet, Take 1 tablet (5 mg total) by mouth daily, Disp: 90 tablet, Rfl: 1    fexofenadine (ALLEGRA) 180 MG tablet, Take 180 mg by mouth daily  , Disp: , Rfl:     lisinopril-hydrochlorothiazide (PRINZIDE,ZESTORETIC) 10-12 5 MG per tablet, Take 1 tablet by mouth daily, Disp: 90 tablet, Rfl: 1    sildenafil (VIAGRA) 100 mg tablet, Take 1 tablet (100 mg total) by mouth daily as needed for erectile dysfunction, Disp: 30 tablet, Rfl: 0  Allergies   Allergen Reactions    Pollen Extract      Plants     Past Surgical History:   Procedure Laterality Date    BACK SURGERY  2005    Laminectomy    KNEE SURGERY Right 1994         Review of Systems   Constitutional: Negative  Negative for activity change, appetite change, chills, diaphoresis, fatigue, fever and unexpected weight change  HENT: Negative  Eyes: Negative  Respiratory: Negative  Cardiovascular: Negative  Gastrointestinal: Negative  Endocrine: Negative  Genitourinary: Negative  Musculoskeletal: Negative  Skin: Negative  Neurological: Negative  Hematological: Negative  Psychiatric/Behavioral: The patient is not nervous/anxious  Objective:      /74 (BP Location: Left arm, Patient Position: Sitting, Cuff Size: Large)   Pulse 80   Temp 98 1 °F (36 7 °C) (Tympanic)   Ht 5' 10 16" (1 782 m)   Wt 112 kg (247 lb 9 6 oz)   SpO2 97%   BMI 35 37 kg/m²          Physical Exam  Vitals reviewed  Constitutional:       General: He is not in acute distress  Appearance: He is obese  He is not ill-appearing, toxic-appearing or diaphoretic  HENT:      Head: Normocephalic and atraumatic  Right Ear: External ear normal       Left Ear: External ear normal       Nose: Nose normal    Eyes:      General: No scleral icterus  Conjunctiva/sclera: Conjunctivae normal       Pupils: Pupils are equal, round, and reactive to light  Neck:      Vascular: No carotid bruit or JVD  Trachea: No tracheal deviation  Cardiovascular:      Rate and Rhythm: Normal rate and regular rhythm  Pulses: Normal pulses  Heart sounds: Normal heart sounds  No murmur heard  Pulmonary:      Effort: Pulmonary effort is normal  No respiratory distress  Breath sounds: Normal breath sounds  No rales  Abdominal:      General: Abdomen is flat   There is no distension  Musculoskeletal:         General: No swelling  Cervical back: Neck supple  Right lower leg: No edema  Left lower leg: No edema  Skin:     General: Skin is warm  Coloration: Skin is not jaundiced  Findings: No bruising, erythema or rash  Neurological:      General: No focal deficit present  Mental Status: He is alert and oriented to person, place, and time  Mental status is at baseline     Psychiatric:         Mood and Affect: Mood normal          Behavior: Behavior normal

## 2022-05-26 ENCOUNTER — APPOINTMENT (OUTPATIENT)
Dept: LAB | Age: 57
End: 2022-05-26

## 2022-05-26 ENCOUNTER — APPOINTMENT (OUTPATIENT)
Dept: LAB | Age: 57
End: 2022-05-26
Payer: COMMERCIAL

## 2022-05-26 DIAGNOSIS — I10 ESSENTIAL HYPERTENSION: ICD-10-CM

## 2022-05-26 DIAGNOSIS — E78.2 MIXED HYPERLIPIDEMIA: ICD-10-CM

## 2022-05-26 DIAGNOSIS — E66.09 CLASS 2 OBESITY DUE TO EXCESS CALORIES WITHOUT SERIOUS COMORBIDITY WITH BODY MASS INDEX (BMI) OF 35.0 TO 35.9 IN ADULT: ICD-10-CM

## 2022-05-26 DIAGNOSIS — Z00.8 HEALTH EXAMINATION IN POPULATION SURVEY: ICD-10-CM

## 2022-05-26 LAB
ALBUMIN SERPL BCP-MCNC: 3.8 G/DL (ref 3.5–5)
ALP SERPL-CCNC: 80 U/L (ref 46–116)
ALT SERPL W P-5'-P-CCNC: 35 U/L (ref 12–78)
ANION GAP SERPL CALCULATED.3IONS-SCNC: 8 MMOL/L (ref 4–13)
AST SERPL W P-5'-P-CCNC: 25 U/L (ref 5–45)
BILIRUB SERPL-MCNC: 0.63 MG/DL (ref 0.2–1)
BUN SERPL-MCNC: 14 MG/DL (ref 5–25)
CALCIUM SERPL-MCNC: 9.9 MG/DL (ref 8.3–10.1)
CHLORIDE SERPL-SCNC: 104 MMOL/L (ref 100–108)
CHOLEST SERPL-MCNC: 180 MG/DL
CO2 SERPL-SCNC: 26 MMOL/L (ref 21–32)
CREAT SERPL-MCNC: 1.01 MG/DL (ref 0.6–1.3)
ERYTHROCYTE [DISTWIDTH] IN BLOOD BY AUTOMATED COUNT: 13.3 % (ref 11.6–15.1)
EST. AVERAGE GLUCOSE BLD GHB EST-MCNC: 117 MG/DL
GFR SERPL CREATININE-BSD FRML MDRD: 82 ML/MIN/1.73SQ M
GLUCOSE P FAST SERPL-MCNC: 107 MG/DL (ref 65–99)
HBA1C MFR BLD: 5.7 %
HCT VFR BLD AUTO: 46.4 % (ref 36.5–49.3)
HDLC SERPL-MCNC: 32 MG/DL
HGB BLD-MCNC: 15.9 G/DL (ref 12–17)
LDLC SERPL CALC-MCNC: 122 MG/DL (ref 0–100)
MCH RBC QN AUTO: 29.1 PG (ref 26.8–34.3)
MCHC RBC AUTO-ENTMCNC: 34.3 G/DL (ref 31.4–37.4)
MCV RBC AUTO: 85 FL (ref 82–98)
NONHDLC SERPL-MCNC: 148 MG/DL
PLATELET # BLD AUTO: 212 THOUSANDS/UL (ref 149–390)
PMV BLD AUTO: 10.2 FL (ref 8.9–12.7)
POTASSIUM SERPL-SCNC: 3.9 MMOL/L (ref 3.5–5.3)
PROT SERPL-MCNC: 7.9 G/DL (ref 6.4–8.2)
RBC # BLD AUTO: 5.46 MILLION/UL (ref 3.88–5.62)
SODIUM SERPL-SCNC: 138 MMOL/L (ref 136–145)
TRIGL SERPL-MCNC: 129 MG/DL
WBC # BLD AUTO: 7.58 THOUSAND/UL (ref 4.31–10.16)

## 2022-05-26 PROCEDURE — 85027 COMPLETE CBC AUTOMATED: CPT

## 2022-05-26 PROCEDURE — 36415 COLL VENOUS BLD VENIPUNCTURE: CPT

## 2022-05-26 PROCEDURE — 80061 LIPID PANEL: CPT

## 2022-05-26 PROCEDURE — 80053 COMPREHEN METABOLIC PANEL: CPT

## 2022-05-26 PROCEDURE — 83036 HEMOGLOBIN GLYCOSYLATED A1C: CPT

## 2022-08-29 DIAGNOSIS — I10 ESSENTIAL HYPERTENSION: ICD-10-CM

## 2022-08-29 RX ORDER — AMLODIPINE BESYLATE 5 MG/1
5 TABLET ORAL DAILY
Qty: 90 TABLET | Refills: 1 | Status: SHIPPED | OUTPATIENT
Start: 2022-08-29

## 2022-08-29 RX ORDER — LISINOPRIL AND HYDROCHLOROTHIAZIDE 12.5; 1 MG/1; MG/1
1 TABLET ORAL DAILY
Qty: 90 TABLET | Refills: 1 | Status: SHIPPED | OUTPATIENT
Start: 2022-08-29

## 2022-12-21 DIAGNOSIS — I10 ESSENTIAL HYPERTENSION: ICD-10-CM

## 2022-12-21 DIAGNOSIS — N52.9 ERECTILE DYSFUNCTION, UNSPECIFIED ERECTILE DYSFUNCTION TYPE: ICD-10-CM

## 2022-12-21 DIAGNOSIS — E78.2 MIXED HYPERLIPIDEMIA: ICD-10-CM

## 2022-12-22 RX ORDER — SILDENAFIL 100 MG/1
100 TABLET, FILM COATED ORAL DAILY PRN
Qty: 30 TABLET | Refills: 5 | Status: SHIPPED | OUTPATIENT
Start: 2022-12-22

## 2023-03-20 DIAGNOSIS — I10 ESSENTIAL HYPERTENSION: ICD-10-CM

## 2023-03-20 RX ORDER — AMLODIPINE BESYLATE 5 MG/1
TABLET ORAL
Qty: 90 TABLET | Refills: 0 | OUTPATIENT
Start: 2023-03-20

## 2023-03-20 RX ORDER — LISINOPRIL AND HYDROCHLOROTHIAZIDE 12.5; 1 MG/1; MG/1
TABLET ORAL
Qty: 90 TABLET | Refills: 0 | OUTPATIENT
Start: 2023-03-20

## 2023-03-21 RX ORDER — AMLODIPINE BESYLATE 5 MG/1
5 TABLET ORAL DAILY
Qty: 90 TABLET | Refills: 1 | Status: SHIPPED | OUTPATIENT
Start: 2023-03-21

## 2023-03-21 RX ORDER — LISINOPRIL AND HYDROCHLOROTHIAZIDE 12.5; 1 MG/1; MG/1
1 TABLET ORAL DAILY
Qty: 90 TABLET | Refills: 1 | Status: SHIPPED | OUTPATIENT
Start: 2023-03-21

## 2023-03-21 NOTE — TELEPHONE ENCOUNTER
This request came from [facility] and the patient has a follow up  It should have been filled  We do take requests from [facility]

## 2023-07-06 ENCOUNTER — OFFICE VISIT (OUTPATIENT)
Dept: INTERNAL MEDICINE CLINIC | Facility: CLINIC | Age: 58
End: 2023-07-06
Payer: COMMERCIAL

## 2023-07-06 VITALS
WEIGHT: 243 LBS | HEART RATE: 84 BPM | BODY MASS INDEX: 34.79 KG/M2 | DIASTOLIC BLOOD PRESSURE: 90 MMHG | TEMPERATURE: 97.7 F | OXYGEN SATURATION: 98 % | SYSTOLIC BLOOD PRESSURE: 130 MMHG | HEIGHT: 70 IN

## 2023-07-06 DIAGNOSIS — I10 ESSENTIAL HYPERTENSION: Primary | ICD-10-CM

## 2023-07-06 DIAGNOSIS — E78.2 MIXED HYPERLIPIDEMIA: ICD-10-CM

## 2023-07-06 DIAGNOSIS — Z00.00 ANNUAL PHYSICAL EXAM: ICD-10-CM

## 2023-07-06 PROCEDURE — 99396 PREV VISIT EST AGE 40-64: CPT | Performed by: INTERNAL MEDICINE

## 2023-07-06 NOTE — PROGRESS NOTES
ADULT ANNUAL  [facility] [place]    NAME: Ashvin Crocker  AGE: 61 y.o. SEX: male  : 1965     DATE: 2023     Assessment and Plan:     Problem List Items Addressed This Visit        Cardiovascular and Mediastinum    Essential hypertension - Primary     We will continue current antihypertensive therapy. Patient would like to begin an intensive program of physical workout while also monitoring and controlling sodium intake over the next 6 months and if his blood pressure should remain elevated he is agreeable to adjustments in his antihypertensive regimen         Relevant Orders    PSA, Total Screen    Comprehensive metabolic panel    CBC and differential       Other    Hyperlipidemia     Triglycerides normal.  HDL cholesterol is low at 32. LDL cholesterol is modestly elevated at 122 but still below the goal for a patient without CAD         Relevant Orders    PSA, Total Screen    Comprehensive metabolic panel    CBC and differential       Immunizations and preventive care screenings were discussed with patient today. Appropriate education was printed on patient's after visit summary. Discussed risks and benefits of prostate cancer screening. We discussed the controversial history of PSA screening for prostate cancer in the [facility] as well as the risk of over detection and over treatment of prostate cancer by way of PSA screening. The patient understands that PSA blood testing is an imperfect way to screen for prostate cancer and that elevated PSA levels in the blood may also be caused by infection, inflammation, prostatic trauma or manipulation, urological procedures, or by benign prostatic enlargement.     The role of the digital rectal examination in prostate cancer screening was also discussed and I discussed with him that there is large interobserver variability in the findings of digital rectal examination. Counseling:  · Alcohol/drug use: discussed moderation in alcohol intake, the recommendations for healthy alcohol use, and avoidance of illicit drug use. No follow-ups on file. Chief Complaint:     Chief Complaint   Patient presents with   • Physical Exam     Annual physical   • Health Screening     Depression screen      History of Present Illness:     Adult Annual Physical   Patient here for a comprehensive physical exam. The patient reports no problems. Diet and Physical Activity  · Diet/Nutrition: well balanced diet, consuming 3-5 servings of fruits/vegetables daily and adequate whole grain intake. · Exercise: no formal exercise. Depression Screening  PHQ-2/9 Depression Screening    Little interest or pleasure in doing things: 0 - not at all  Feeling down, depressed, or hopeless: 0 - not at all  PHQ-2 Score: 0  PHQ-2 Interpretation: Negative depression screen       General Health  · Sleep: gets 7-8 hours of sleep on average. · Hearing: normal - bilateral.  · Vision: no vision problems, wears glasses and wears contacts. · Dental: regular dental visits.  Health  · Symptoms include: none     Review of Systems:     Review of Systems   HENT: Negative. Eyes: Negative. Respiratory: Negative. Cardiovascular: Negative. Gastrointestinal: Negative. Endocrine: Negative. Genitourinary: Negative. Musculoskeletal: Positive for back pain (intermittent). Skin: Negative. Neurological: Negative. Hematological: Negative. Psychiatric/Behavioral: Negative.        Past Medical History:     Past Medical History:   Diagnosis Date   • COVID 03/2022   • GERD (gastroesophageal reflux disease)    • Hypertension    • Impingement syndrome of right shoulder 10/3/2019   • Seasonal allergies       Past Surgical History:     Past Surgical History:   Procedure Laterality Date   • BACK SURGERY  2005    Laminectomy   • KNEE SURGERY Right 1994      Family History: Family History   Problem Relation Age of Onset   • Heart disease Mother    • Heart attack Mother    • Heart disease Father    • Stroke Father    • Heart attack Father    • Aortic stenosis Brother         Aortic valve replacement, carotid stenosis      Social History:     Social History     Socioeconomic History   • Marital status: /Civil Union     Spouse name: None   • Number of children: None   • Years of education: None   • Highest education level: None   Occupational History   • None   Tobacco Use   • Smoking status: Former     Packs/day: 0.30     Years: 10.00     Total pack years: 3.00     Types: Cigarettes     Start date: 25     Quit date: 1/1/2013     Years since quitting: 10.5   • Smokeless tobacco: Former     Types: Chew   • Tobacco comments:     1 cigarette per week for approximately 10 years   Vaping Use   • Vaping Use: Never used   Substance and Sexual Activity   • Alcohol use:  Yes     Alcohol/week: 2.0 standard drinks of alcohol     Types: 2 Standard drinks or equivalent per week     Comment: social   • Drug use: Never   • Sexual activity: None   Other Topics Concern   • None   Social History Narrative   • None     Social Determinants of Health     Financial Resource Strain: Not on file   Food Insecurity: Not on file   Transportation Needs: Not on file   Physical Activity: Not on file   Stress: Not on file   Social Connections: Not on file   Intimate Partner Violence: Not on file   Housing Stability: Not on file      Current Medications:     Current Outpatient Medications   Medication Sig Dispense Refill   • amLODIPine (NORVASC) 5 mg tablet Take 1 tablet (5 mg total) by mouth daily 90 tablet 1   • fexofenadine (ALLEGRA) 180 MG tablet Take 180 mg by mouth daily       • lisinopril-hydrochlorothiazide (PRINZIDE,ZESTORETIC) 10-12.5 MG per tablet Take 1 tablet by mouth daily 90 tablet 1   • sildenafil (VIAGRA) 100 mg tablet Take 1 tablet (100 mg total) by mouth daily as needed for erectile dysfunction 30 tablet 5     No current facility-administered medications for this visit. Allergies: Allergies   Allergen Reactions   • Pollen Extract      Plants      Physical Exam:     /90 (BP Location: Left arm, Patient Position: Sitting, Cuff Size: Large)   Pulse 84   Temp 97.7 °F (36.5 °C) (Temporal)   Ht 5' 10.28" (1.785 m)   Wt 110 kg (243 lb)   SpO2 98%   BMI 34.59 kg/m²     Physical Exam  Vitals reviewed. Constitutional:       General: He is not in acute distress. Appearance: Normal appearance. He is well-developed. He is obese. He is not ill-appearing or toxic-appearing. HENT:      Head: Normocephalic and atraumatic. Right Ear: Tympanic membrane, ear canal and external ear normal. There is no impacted cerumen. Left Ear: Tympanic membrane, ear canal and external ear normal. There is no impacted cerumen. Nose: Nose normal.      Mouth/Throat:      Mouth: Mucous membranes are moist.      Pharynx: Oropharynx is clear. Eyes:      General: No scleral icterus. Conjunctiva/sclera: Conjunctivae normal.      Pupils: Pupils are equal, round, and reactive to light. Neck:      Vascular: No carotid bruit. Cardiovascular:      Rate and Rhythm: Normal rate and regular rhythm. Heart sounds: Normal heart sounds. No murmur heard. Pulmonary:      Effort: Pulmonary effort is normal. No respiratory distress. Breath sounds: Normal breath sounds. Abdominal:      General: There is no distension. Palpations: Abdomen is soft. Tenderness: There is no abdominal tenderness. Musculoskeletal:         General: No swelling, tenderness or deformity. Normal range of motion. Cervical back: Normal range of motion and neck supple. Right lower leg: No edema. Left lower leg: No edema. Skin:     General: Skin is warm. Coloration: Skin is not jaundiced. Findings: No bruising, erythema or rash.    Neurological:      General: No focal deficit present. Mental Status: He is alert and oriented to person, place, and time. Mental status is at baseline. Psychiatric:         Mood and Affect: Mood normal.         Behavior: Behavior normal.         Thought Content:  Thought content normal.         Judgment: Judgment normal.          Gato Hernandez MD  [address] [facility] [place]

## 2023-07-06 NOTE — ASSESSMENT & PLAN NOTE
Triglycerides normal.  HDL cholesterol is low at 32.   LDL cholesterol is modestly elevated at 122 but still below the goal for a patient without CAD
We will continue current antihypertensive therapy.   Patient would like to begin an intensive program of physical workout while also monitoring and controlling sodium intake over the next 6 months and if his blood pressure should remain elevated he is agreeable to adjustments in his antihypertensive regimen
patient does not know address lives in [place]

## 2023-07-21 ENCOUNTER — APPOINTMENT (OUTPATIENT)
Dept: LAB | Facility: IMAGING CENTER | Age: 58
End: 2023-07-21
Payer: COMMERCIAL

## 2023-07-21 DIAGNOSIS — Z00.8 HEALTH EXAMINATION IN POPULATION SURVEY: ICD-10-CM

## 2023-07-21 DIAGNOSIS — I10 ESSENTIAL HYPERTENSION: ICD-10-CM

## 2023-07-21 DIAGNOSIS — E78.2 MIXED HYPERLIPIDEMIA: ICD-10-CM

## 2023-07-21 LAB
ALBUMIN SERPL BCP-MCNC: 3.8 G/DL (ref 3.5–5)
ALP SERPL-CCNC: 71 U/L (ref 46–116)
ALT SERPL W P-5'-P-CCNC: 35 U/L (ref 12–78)
ANION GAP SERPL CALCULATED.3IONS-SCNC: 4 MMOL/L
AST SERPL W P-5'-P-CCNC: 26 U/L (ref 5–45)
BASOPHILS # BLD AUTO: 0.03 THOUSANDS/ÂΜL (ref 0–0.1)
BASOPHILS NFR BLD AUTO: 0 % (ref 0–1)
BILIRUB SERPL-MCNC: 0.56 MG/DL (ref 0.2–1)
BUN SERPL-MCNC: 19 MG/DL (ref 5–25)
CALCIUM SERPL-MCNC: 8.9 MG/DL (ref 8.3–10.1)
CHLORIDE SERPL-SCNC: 106 MMOL/L (ref 96–108)
CHOLEST SERPL-MCNC: 171 MG/DL
CO2 SERPL-SCNC: 28 MMOL/L (ref 21–32)
CREAT SERPL-MCNC: 0.98 MG/DL (ref 0.6–1.3)
EOSINOPHIL # BLD AUTO: 0.24 THOUSAND/ÂΜL (ref 0–0.61)
EOSINOPHIL NFR BLD AUTO: 3 % (ref 0–6)
ERYTHROCYTE [DISTWIDTH] IN BLOOD BY AUTOMATED COUNT: 13.1 % (ref 11.6–15.1)
EST. AVERAGE GLUCOSE BLD GHB EST-MCNC: 111 MG/DL
GFR SERPL CREATININE-BSD FRML MDRD: 84 ML/MIN/1.73SQ M
GLUCOSE P FAST SERPL-MCNC: 111 MG/DL (ref 65–99)
HBA1C MFR BLD: 5.5 %
HCT VFR BLD AUTO: 41.9 % (ref 36.5–49.3)
HDLC SERPL-MCNC: 33 MG/DL
HGB BLD-MCNC: 14.5 G/DL (ref 12–17)
IMM GRANULOCYTES # BLD AUTO: 0.02 THOUSAND/UL (ref 0–0.2)
IMM GRANULOCYTES NFR BLD AUTO: 0 % (ref 0–2)
LDLC SERPL CALC-MCNC: 121 MG/DL (ref 0–100)
LYMPHOCYTES # BLD AUTO: 1.88 THOUSANDS/ÂΜL (ref 0.6–4.47)
LYMPHOCYTES NFR BLD AUTO: 23 % (ref 14–44)
MCH RBC QN AUTO: 30.2 PG (ref 26.8–34.3)
MCHC RBC AUTO-ENTMCNC: 34.6 G/DL (ref 31.4–37.4)
MCV RBC AUTO: 87 FL (ref 82–98)
MONOCYTES # BLD AUTO: 0.73 THOUSAND/ÂΜL (ref 0.17–1.22)
MONOCYTES NFR BLD AUTO: 9 % (ref 4–12)
NEUTROPHILS # BLD AUTO: 5.22 THOUSANDS/ÂΜL (ref 1.85–7.62)
NEUTS SEG NFR BLD AUTO: 65 % (ref 43–75)
NONHDLC SERPL-MCNC: 138 MG/DL
NRBC BLD AUTO-RTO: 0 /100 WBCS
PLATELET # BLD AUTO: 253 THOUSANDS/UL (ref 149–390)
PMV BLD AUTO: 10.2 FL (ref 8.9–12.7)
POTASSIUM SERPL-SCNC: 4.1 MMOL/L (ref 3.5–5.3)
PROT SERPL-MCNC: 7.9 G/DL (ref 6.4–8.4)
PSA SERPL-MCNC: 0.5 NG/ML (ref 0–4)
RBC # BLD AUTO: 4.8 MILLION/UL (ref 3.88–5.62)
SODIUM SERPL-SCNC: 138 MMOL/L (ref 135–147)
TRIGL SERPL-MCNC: 87 MG/DL
WBC # BLD AUTO: 8.12 THOUSAND/UL (ref 4.31–10.16)

## 2023-07-21 PROCEDURE — 80053 COMPREHEN METABOLIC PANEL: CPT

## 2023-07-21 PROCEDURE — 36415 COLL VENOUS BLD VENIPUNCTURE: CPT

## 2023-07-21 PROCEDURE — G0103 PSA SCREENING: HCPCS

## 2023-07-21 PROCEDURE — 85025 COMPLETE CBC W/AUTO DIFF WBC: CPT

## 2023-07-21 PROCEDURE — 83036 HEMOGLOBIN GLYCOSYLATED A1C: CPT

## 2023-07-21 PROCEDURE — 80061 LIPID PANEL: CPT

## 2023-09-07 ENCOUNTER — TELEPHONE (OUTPATIENT)
Dept: INTERNAL MEDICINE CLINIC | Age: 58
End: 2023-09-07

## 2023-09-07 NOTE — TELEPHONE ENCOUNTER
Spoke to patient and recommended scheduling an appointment. Patient declined, will call back in a few days if sore throat continues.

## 2023-09-07 NOTE — TELEPHONE ENCOUNTER
----- Message from [employer] sent at 9/6/2023  1:10 PM EDT -----  Regarding: Sore throat  Contact: [phone]  I have had a sore throat since Monday morning. I woke up with it and thought it was from snoring. It’s Wednesday now and it’s still ingering. It’s not painful to where I would take any medication for it. I’m just not sure how long to wait. I have no cold symptoms, no runny nose, no coughing other than my morning allergies and dairy in the coffee cough.   Lesley Jackson

## 2023-10-20 DIAGNOSIS — I10 ESSENTIAL HYPERTENSION: ICD-10-CM

## 2023-10-20 RX ORDER — AMLODIPINE BESYLATE 5 MG/1
5 TABLET ORAL DAILY
Qty: 90 TABLET | Refills: 1 | Status: SHIPPED | OUTPATIENT
Start: 2023-10-20

## 2023-10-20 RX ORDER — LISINOPRIL AND HYDROCHLOROTHIAZIDE 12.5; 1 MG/1; MG/1
1 TABLET ORAL DAILY
Qty: 90 TABLET | Refills: 1 | Status: SHIPPED | OUTPATIENT
Start: 2023-10-20

## 2024-02-03 DIAGNOSIS — Z00.6 ENCOUNTER FOR EXAMINATION FOR NORMAL COMPARISON OR CONTROL IN CLINICAL RESEARCH PROGRAM: ICD-10-CM

## 2024-02-21 ENCOUNTER — APPOINTMENT (OUTPATIENT)
Dept: LAB | Age: 59
End: 2024-02-21

## 2024-02-21 DIAGNOSIS — Z00.6 ENCOUNTER FOR EXAMINATION FOR NORMAL COMPARISON OR CONTROL IN CLINICAL RESEARCH PROGRAM: ICD-10-CM

## 2024-02-21 PROCEDURE — 36415 COLL VENOUS BLD VENIPUNCTURE: CPT

## 2024-03-10 LAB
APOB+LDLR+PCSK9 GENE MUT ANL BLD/T: NOT DETECTED
BRCA1+BRCA2 DEL+DUP + FULL MUT ANL BLD/T: NOT DETECTED
MLH1+MSH2+MSH6+PMS2 GN DEL+DUP+FUL M: NOT DETECTED

## 2024-04-22 NOTE — PROGRESS NOTES
Assessment/Plan:    Mass in neck  -recommend getting blood work done  -will get US               Diagnoses and all orders for this visit:    Screening for metabolic disorder  -     Comprehensive metabolic panel; Future  -     CBC and differential  -     Lipid panel    Hyperlipidemia, unspecified hyperlipidemia type  -     Lipid panel    Elevated fasting glucose  -     Hemoglobin A1C    Screening for prostate cancer  -     PSA, Total Screen; Future    Mass in neck  -     US head neck soft tissue; Future          Subjective:      Patient ID: Yovany Cade is a 58 y.o. male.    Patient today with concerns for swelling on the left side of neck.    He reports on Friday he notice swelling to the left side of his collar bone.   He did just get home from a trip in [place]   Denies recent injury, history of left shoulder injury    Denies recent illness, denies fevers and chills     Denies pain to the area  Reports full range of motion to left arm       The following portions of the patient's history were reviewed and updated as appropriate: allergies, current medications, past family history, past medical history, past social history, past surgical history, and problem list.    Review of Systems   Constitutional:  Negative for activity change, appetite change, chills, diaphoresis and fever.   HENT:  Negative for congestion, ear discharge, ear pain, postnasal drip, rhinorrhea, sinus pressure, sinus pain and sore throat.    Eyes:  Negative for pain, discharge, itching and visual disturbance.   Respiratory:  Negative for cough, chest tightness, shortness of breath and wheezing.    Cardiovascular:  Negative for chest pain, palpitations and leg swelling.   Gastrointestinal:  Negative for abdominal pain, constipation, diarrhea, nausea and vomiting.   Endocrine: Negative for polydipsia, polyphagia and polyuria.   Genitourinary:  Negative for difficulty urinating, dysuria and urgency.   Musculoskeletal:  Negative for  "arthralgias, back pain and neck pain.   Skin:  Negative for rash and wound.   Neurological:  Negative for dizziness, weakness, numbness and headaches.         Past Medical History:   Diagnosis Date    COVID 03/2022    GERD (gastroesophageal reflux disease)     Hypertension     Impingement syndrome of right shoulder 10/3/2019    Seasonal allergies          Current Outpatient Medications:     amLODIPine (NORVASC) 5 mg tablet, TAKE ONE TABLET BY MOUTH EVERY DAY, Disp: 90 tablet, Rfl: 1    fexofenadine (ALLEGRA) 180 MG tablet, Take 180 mg by mouth daily  , Disp: , Rfl:     lisinopril-hydrochlorothiazide (PRINZIDE,ZESTORETIC) 10-12.5 MG per tablet, TAKE ONE TABLET BY MOUTH EVERY DAY, Disp: 90 tablet, Rfl: 1    sildenafil (VIAGRA) 100 mg tablet, Take 1 tablet (100 mg total) by mouth daily as needed for erectile dysfunction, Disp: 30 tablet, Rfl: 5    Allergies   Allergen Reactions    Pollen Extract      Plants       Social History   Past Surgical History:   Procedure Laterality Date    BACK SURGERY  2005    Laminectomy    KNEE SURGERY Right 1994     Family History   Problem Relation Age of Onset    Heart disease Mother     Heart attack Mother     Heart disease Father     Stroke Father     Heart attack Father     Aortic stenosis Brother         Aortic valve replacement, carotid stenosis       Objective:  /84 (BP Location: Left arm, Patient Position: Sitting, Cuff Size: Standard)   Pulse 84   Temp (!) 96.9 °F (36.1 °C) (Temporal)   Ht 5' 10.28\" (1.785 m)   Wt 110 kg (243 lb 3.2 oz)   SpO2 97%   BMI 34.62 kg/m²     No results found for this or any previous visit (from the past 1344 hour(s)).         Physical Exam  Constitutional:       General: He is not in acute distress.     Appearance: He is well-developed. He is not diaphoretic.   HENT:      Head: Normocephalic and atraumatic.      Right Ear: External ear normal.      Left Ear: External ear normal.      Nose: Nose normal.      Mouth/Throat:      Mouth: Mucous " membranes are moist.      Pharynx: No oropharyngeal exudate or posterior oropharyngeal erythema.   Eyes:      General:         Right eye: No discharge.         Left eye: No discharge.      Conjunctiva/sclera: Conjunctivae normal.      Pupils: Pupils are equal, round, and reactive to light.   Neck:      Thyroid: No thyromegaly.     Cardiovascular:      Rate and Rhythm: Normal rate and regular rhythm.      Heart sounds: Normal heart sounds. No murmur heard.     No friction rub. No gallop.   Pulmonary:      Effort: Pulmonary effort is normal. No respiratory distress.      Breath sounds: Normal breath sounds. No stridor. No wheezing or rales.   Abdominal:      General: Bowel sounds are normal. There is no distension.      Palpations: Abdomen is soft.      Tenderness: There is no abdominal tenderness.   Musculoskeletal:      Cervical back: Normal range of motion and neck supple.   Lymphadenopathy:      Head:      Right side of head: Submandibular and tonsillar adenopathy present.      Left side of head: Submandibular and tonsillar adenopathy present.      Cervical: No cervical adenopathy.   Skin:     General: Skin is warm and dry.      Findings: No erythema or rash.   Neurological:      Mental Status: He is alert and oriented to person, place, and time.   Psychiatric:         Behavior: Behavior normal.         Thought Content: Thought content normal.         Judgment: Judgment normal.

## 2024-04-23 ENCOUNTER — HOSPITAL ENCOUNTER (OUTPATIENT)
Dept: RADIOLOGY | Age: 59
Discharge: HOME | End: 2024-04-23
Payer: COMMERCIAL

## 2024-04-23 ENCOUNTER — OFFICE VISIT (OUTPATIENT)
Age: 59
End: 2024-04-23
Payer: COMMERCIAL

## 2024-04-23 VITALS
OXYGEN SATURATION: 97 % | WEIGHT: 243.2 LBS | DIASTOLIC BLOOD PRESSURE: 84 MMHG | HEART RATE: 84 BPM | SYSTOLIC BLOOD PRESSURE: 130 MMHG | HEIGHT: 70 IN | TEMPERATURE: 96.9 F | BODY MASS INDEX: 34.82 KG/M2

## 2024-04-23 DIAGNOSIS — Z13.228 SCREENING FOR METABOLIC DISORDER: Primary | ICD-10-CM

## 2024-04-23 DIAGNOSIS — E78.5 HYPERLIPIDEMIA, UNSPECIFIED HYPERLIPIDEMIA TYPE: ICD-10-CM

## 2024-04-23 DIAGNOSIS — Z12.5 SCREENING FOR PROSTATE CANCER: ICD-10-CM

## 2024-04-23 DIAGNOSIS — R22.1 MASS IN NECK: ICD-10-CM

## 2024-04-23 DIAGNOSIS — R73.01 ELEVATED FASTING GLUCOSE: ICD-10-CM

## 2024-04-23 PROCEDURE — 76536 US EXAM OF HEAD AND NECK: CPT

## 2024-04-23 PROCEDURE — 99213 OFFICE O/P EST LOW 20 MIN: CPT | Performed by: NURSE PRACTITIONER

## 2024-04-24 ENCOUNTER — APPOINTMENT (OUTPATIENT)
Dept: LAB | Age: 59
End: 2024-04-24
Payer: COMMERCIAL

## 2024-04-24 ENCOUNTER — APPOINTMENT (OUTPATIENT)
Dept: LAB | Age: 59
End: 2024-04-24

## 2024-04-24 DIAGNOSIS — Z13.228 SCREENING FOR METABOLIC DISORDER: ICD-10-CM

## 2024-04-24 DIAGNOSIS — Z00.8 HEALTH EXAMINATION IN POPULATION SURVEY: ICD-10-CM

## 2024-04-24 DIAGNOSIS — Z12.5 SCREENING FOR PROSTATE CANCER: ICD-10-CM

## 2024-04-24 LAB
ALBUMIN SERPL BCP-MCNC: 4.1 G/DL (ref 3.5–5)
ALP SERPL-CCNC: 56 U/L (ref 34–104)
ALT SERPL W P-5'-P-CCNC: 22 U/L (ref 7–52)
ANION GAP SERPL CALCULATED.3IONS-SCNC: 6 MMOL/L (ref 4–13)
AST SERPL W P-5'-P-CCNC: 19 U/L (ref 13–39)
BASOPHILS # BLD AUTO: 0.05 THOUSANDS/ÂΜL (ref 0–0.1)
BASOPHILS NFR BLD AUTO: 1 % (ref 0–1)
BILIRUB SERPL-MCNC: 0.63 MG/DL (ref 0.2–1)
BUN SERPL-MCNC: 17 MG/DL (ref 5–25)
CALCIUM SERPL-MCNC: 8.8 MG/DL (ref 8.4–10.2)
CHLORIDE SERPL-SCNC: 100 MMOL/L (ref 96–108)
CHOLEST SERPL-MCNC: 171 MG/DL
CO2 SERPL-SCNC: 30 MMOL/L (ref 21–32)
CREAT SERPL-MCNC: 0.98 MG/DL (ref 0.6–1.3)
EOSINOPHIL # BLD AUTO: 0.24 THOUSAND/ÂΜL (ref 0–0.61)
EOSINOPHIL NFR BLD AUTO: 3 % (ref 0–6)
ERYTHROCYTE [DISTWIDTH] IN BLOOD BY AUTOMATED COUNT: 12.6 % (ref 11.6–15.1)
EST. AVERAGE GLUCOSE BLD GHB EST-MCNC: 120 MG/DL
GFR SERPL CREATININE-BSD FRML MDRD: 84 ML/MIN/1.73SQ M
GLUCOSE P FAST SERPL-MCNC: 99 MG/DL (ref 65–99)
HBA1C MFR BLD: 5.8 %
HCT VFR BLD AUTO: 46.2 % (ref 36.5–49.3)
HDLC SERPL-MCNC: 34 MG/DL
HGB BLD-MCNC: 15.9 G/DL (ref 12–17)
IMM GRANULOCYTES # BLD AUTO: 0.03 THOUSAND/UL (ref 0–0.2)
IMM GRANULOCYTES NFR BLD AUTO: 0 % (ref 0–2)
LDLC SERPL CALC-MCNC: 115 MG/DL (ref 0–100)
LYMPHOCYTES # BLD AUTO: 2.21 THOUSANDS/ÂΜL (ref 0.6–4.47)
LYMPHOCYTES NFR BLD AUTO: 25 % (ref 14–44)
MCH RBC QN AUTO: 30.5 PG (ref 26.8–34.3)
MCHC RBC AUTO-ENTMCNC: 34.4 G/DL (ref 31.4–37.4)
MCV RBC AUTO: 89 FL (ref 82–98)
MONOCYTES # BLD AUTO: 0.88 THOUSAND/ÂΜL (ref 0.17–1.22)
MONOCYTES NFR BLD AUTO: 10 % (ref 4–12)
NEUTROPHILS # BLD AUTO: 5.3 THOUSANDS/ÂΜL (ref 1.85–7.62)
NEUTS SEG NFR BLD AUTO: 61 % (ref 43–75)
NONHDLC SERPL-MCNC: 137 MG/DL
NRBC BLD AUTO-RTO: 0 /100 WBCS
PLATELET # BLD AUTO: 231 THOUSANDS/UL (ref 149–390)
PMV BLD AUTO: 10.3 FL (ref 8.9–12.7)
POTASSIUM SERPL-SCNC: 4.2 MMOL/L (ref 3.5–5.3)
PROT SERPL-MCNC: 7.3 G/DL (ref 6.4–8.4)
PSA SERPL-MCNC: 0.54 NG/ML (ref 0–4)
RBC # BLD AUTO: 5.21 MILLION/UL (ref 3.88–5.62)
SODIUM SERPL-SCNC: 136 MMOL/L (ref 135–147)
TRIGL SERPL-MCNC: 110 MG/DL
WBC # BLD AUTO: 8.71 THOUSAND/UL (ref 4.31–10.16)

## 2024-04-24 PROCEDURE — 85025 COMPLETE CBC W/AUTO DIFF WBC: CPT | Performed by: NURSE PRACTITIONER

## 2024-04-24 PROCEDURE — 80053 COMPREHEN METABOLIC PANEL: CPT

## 2024-04-24 PROCEDURE — G0103 PSA SCREENING: HCPCS

## 2024-04-24 PROCEDURE — 80061 LIPID PANEL: CPT | Performed by: NURSE PRACTITIONER

## 2024-04-24 PROCEDURE — 83036 HEMOGLOBIN GLYCOSYLATED A1C: CPT | Performed by: NURSE PRACTITIONER

## 2024-04-24 PROCEDURE — 36415 COLL VENOUS BLD VENIPUNCTURE: CPT

## 2024-06-13 DIAGNOSIS — I10 ESSENTIAL HYPERTENSION: ICD-10-CM

## 2024-06-14 RX ORDER — LISINOPRIL AND HYDROCHLOROTHIAZIDE 12.5; 1 MG/1; MG/1
1 TABLET ORAL DAILY
Qty: 90 TABLET | Refills: 1 | Status: SHIPPED | OUTPATIENT
Start: 2024-06-14

## 2024-06-14 RX ORDER — AMLODIPINE BESYLATE 5 MG/1
5 TABLET ORAL DAILY
Qty: 90 TABLET | Refills: 1 | Status: SHIPPED | OUTPATIENT
Start: 2024-06-14

## 2024-09-23 DIAGNOSIS — N52.9 ERECTILE DYSFUNCTION, UNSPECIFIED ERECTILE DYSFUNCTION TYPE: ICD-10-CM

## 2024-09-23 DIAGNOSIS — E78.2 MIXED HYPERLIPIDEMIA: ICD-10-CM

## 2024-09-23 DIAGNOSIS — I10 ESSENTIAL HYPERTENSION: ICD-10-CM

## 2024-09-23 RX ORDER — SILDENAFIL 100 MG/1
TABLET, FILM COATED ORAL
Qty: 90 TABLET | Refills: 1 | Status: SHIPPED | OUTPATIENT
Start: 2024-09-23

## 2024-09-24 ENCOUNTER — OFFICE VISIT (OUTPATIENT)
Age: 59
End: 2024-09-24
Payer: COMMERCIAL

## 2024-09-24 VITALS
DIASTOLIC BLOOD PRESSURE: 86 MMHG | BODY MASS INDEX: 35.48 KG/M2 | HEIGHT: 70 IN | TEMPERATURE: 98.9 F | WEIGHT: 247.8 LBS | SYSTOLIC BLOOD PRESSURE: 126 MMHG | OXYGEN SATURATION: 96 % | HEART RATE: 80 BPM

## 2024-09-24 DIAGNOSIS — Z00.00 ANNUAL PHYSICAL EXAM: Primary | ICD-10-CM

## 2024-09-24 DIAGNOSIS — Z12.5 SCREENING FOR PROSTATE CANCER: ICD-10-CM

## 2024-09-24 DIAGNOSIS — I10 ESSENTIAL HYPERTENSION: ICD-10-CM

## 2024-09-24 PROCEDURE — 99396 PREV VISIT EST AGE 40-64: CPT | Performed by: INTERNAL MEDICINE

## 2024-09-24 NOTE — ASSESSMENT & PLAN NOTE
No significant problems encountered.  Will schedule follow-up in 1 year.  Routine lab screenings have been requested

## 2024-09-24 NOTE — PROGRESS NOTES
Adult Annual Physical  Name: Yovany Cade      : 1965      MRN: [id number]  Encounter Provider: Clyde Castillo MD  Encounter Date: 2024   Encounter department: [facility] [place]    Assessment & Plan    Immunizations and preventive care screenings were discussed with patient today. Appropriate education was printed on patient's after visit summary.    Discussed risks and benefits of prostate cancer screening. We discussed the controversial history of PSA screening for prostate cancer in the [place] as well as the risk of over detection and over treatment of prostate cancer by way of PSA screening.  The patient understands that PSA blood testing is an imperfect way to screen for prostate cancer and that elevated PSA levels in the blood may also be caused by infection, inflammation, prostatic trauma or manipulation, urological procedures, or by benign prostatic enlargement.    The role of the digital rectal examination in prostate cancer screening was also discussed and I discussed with him that there is large interobserver variability in the findings of digital rectal examination.           History of Present Illness     Adult Annual Physical:  Patient presents for annual physical.     Diet and Physical Activity:  - Diet/Nutrition: well balanced diet, adequate whole grain intake and consuming 3-5 servings of fruits/vegetables daily.  - Exercise: walking.    General Health:  - Sleep: sleeps well and 7-8 hours of sleep on average.  - Hearing: normal hearing bilateral ears.  - Vision: wears contacts.  - Dental: regular dental visits and brushes teeth once daily.     Health:  - History of STDs: no.   - Urinary symptoms: nocturia.     Advanced Care Planning:  - Has an advanced directive?: no    - Has a durable medical POA?: no    - ACP document given to patient?: no      Review of Systems   Constitutional: Negative.    HENT: Negative.  Negative for  "rhinorrhea.    Eyes: Negative.    Respiratory: Negative.     Cardiovascular: Negative.    Gastrointestinal: Negative.    Endocrine: Negative.    Genitourinary:         Nocturia x 3   Musculoskeletal:  Positive for back pain (hx laminectomy).   Skin: Negative.    Allergic/Immunologic: Positive for environmental allergies.   Neurological: Negative.    Hematological: Negative.    Psychiatric/Behavioral: Negative.           Objective     /86 (BP Location: Left arm, Patient Position: Sitting, Cuff Size: Adult)   Pulse 80   Temp 98.9 °F (37.2 °C) (Temporal)   Ht 5' 10\" (1.778 m)   Wt 112 kg (247 lb 12.8 oz)   SpO2 96% Comment: ra  BMI 35.56 kg/m²     Physical Exam  Vitals reviewed.   Constitutional:       General: He is not in acute distress.     Appearance: Normal appearance. He is obese. He is not ill-appearing, toxic-appearing or diaphoretic.   HENT:      Head: Normocephalic and atraumatic.      Right Ear: Tympanic membrane, ear canal and external ear normal. There is no impacted cerumen.      Left Ear: Tympanic membrane, ear canal and external ear normal. There is no impacted cerumen.      Nose: Nose normal. No congestion or rhinorrhea.      Mouth/Throat:      Mouth: Mucous membranes are moist.      Pharynx: Oropharynx is clear. No oropharyngeal exudate or posterior oropharyngeal erythema.   Eyes:      General: No scleral icterus.     Conjunctiva/sclera: Conjunctivae normal.      Pupils: Pupils are equal, round, and reactive to light.   Neck:      Vascular: No carotid bruit.   Cardiovascular:      Rate and Rhythm: Normal rate and regular rhythm.      Heart sounds: Normal heart sounds. No murmur heard.  Pulmonary:      Effort: Pulmonary effort is normal. No respiratory distress.      Breath sounds: Normal breath sounds.   Abdominal:      General: Bowel sounds are normal. There is no distension.      Palpations: Abdomen is soft. There is no mass.      Tenderness: There is no abdominal tenderness. There is no " guarding or rebound.      Hernia: A hernia (Mild rectus sheath hernia) is present.   Musculoskeletal:         General: No swelling or tenderness. Normal range of motion.      Cervical back: Neck supple. No rigidity.      Right lower leg: No edema.      Left lower leg: No edema.   Lymphadenopathy:      Cervical: No cervical adenopathy.   Skin:     General: Skin is warm and dry.      Coloration: Skin is not jaundiced.      Findings: No erythema or rash.   Neurological:      General: No focal deficit present.      Mental Status: He is alert and oriented to person, place, and time. Mental status is at baseline.   Psychiatric:         Mood and Affect: Mood normal.         Behavior: Behavior normal.         Thought Content: Thought content normal.         Judgment: Judgment normal.

## 2024-09-24 NOTE — PATIENT INSTRUCTIONS
"Patient Education     Routine physical for adults   The Basics   Written by the doctors and editors at [facility]   What is a physical? -- A physical is a routine visit, or \"check-up,\" with your doctor. You might also hear it called a \"wellness visit\" or \"preventive visit.\"  During each visit, the doctor will:   Ask about your physical and mental health   Ask about your habits, behaviors, and lifestyle   Do an exam   Give you vaccines if needed   Talk to you about any medicines you take   Give advice about your health   Answer your questions  Getting regular check-ups is an important part of taking care of your health. It can help your doctor find and treat any problems you have. But it's also important for preventing health problems.  A routine physical is different from a \"sick visit.\" A sick visit is when you see a doctor because of a health concern or problem. Since physicals are scheduled ahead of time, you can think about what you want to ask the doctor.  How often should I get a physical? -- It depends on your age and health. In general, for people age 21 years and older:   If you are younger than 50 years, you might be able to get a physical every 3 years.   If you are 50 years or older, your doctor might recommend a physical every year.  If you have an ongoing health condition, like diabetes or high blood pressure, your doctor will probably want to see you more often.  What happens during a physical? -- In general, each visit will include:   Physical exam - The doctor or nurse will check your height, weight, heart rate, and blood pressure. They will also look at your eyes and ears. They will ask about how you are feeling and whether you have any symptoms that bother you.   Medicines - It's a good idea to bring a list of all the medicines you take to each doctor visit. Your doctor will talk to you about your medicines and answer any questions. Tell them if you are having any side effects that bother you. You " "should also tell them if you are having trouble paying for any of your medicines.   Habits and behaviors - This includes:   Your diet   Your exercise habits   Whether you smoke, drink alcohol, or use drugs   Whether you are sexually active   Whether you feel safe at home  Your doctor will talk to you about things you can do to improve your health and lower your risk of health problems. They will also offer help and support. For example, if you want to quit smoking, they can give you advice and might prescribe medicines. If you want to improve your diet or get more physical activity, they can help you with this, too.   Lab tests, if needed - The tests you get will depend on your age and situation. For example, your doctor might want to check your:   Cholesterol   Blood sugar   Iron level   Vaccines - The recommended vaccines will depend on your age, health, and what vaccines you already had. Vaccines are very important because they can prevent certain serious or deadly infections.   Discussion of screening - \"Screening\" means checking for diseases or other health problems before they cause symptoms. Your doctor can recommend screening based on your age, risk, and preferences. This might include tests to check for:   Cancer, such as breast, prostate, cervical, ovarian, colorectal, prostate, lung, or skin cancer   Sexually transmitted infections, such as chlamydia and gonorrhea   Mental health conditions like depression and anxiety  Your doctor will talk to you about the different types of screening tests. They can help you decide which screenings to have. They can also explain what the results might mean.   Answering questions - The physical is a good time to ask the doctor or nurse questions about your health. If needed, they can refer you to other doctors or specialists, too.  Adults older than 65 years often need other care, too. As you get older, your doctor will talk to you about:   How to prevent falling at " home   Hearing or vision tests   Memory testing   How to take your medicines safely   Making sure that you have the help and support you need at home  All topics are updated as new evidence becomes available and our peer review process is complete.  This topic retrieved from [employer] on: May 02, 2024.  Topic 972381 Version 1.0  Release: 32.4.3 - C32.122  © 2024 UpToDate, Inc. and/or its affiliates. All rights reserved.  Consumer Information Use and Disclaimer   Disclaimer: This generalized information is a limited summary of diagnosis, treatment, and/or medication information. It is not meant to be comprehensive and should be used as a tool to help the user understand and/or assess potential diagnostic and treatment options. It does NOT include all information about conditions, treatments, medications, side effects, or risks that may apply to a specific patient. It is not intended to be medical advice or a substitute for the medical advice, diagnosis, or treatment of a health care provider based on the health care provider's examination and assessment of a patient's specific and unique circumstances. Patients must speak with a health care provider for complete information about their health, medical questions, and treatment options, including any risks or benefits regarding use of medications. This information does not endorse any treatments or medications as safe, effective, or approved for treating a specific patient. UpToDate, Inc. and its affiliates disclaim any warranty or liability relating to this information or the use thereof.The use of this information is governed by the Terms of Use, available at https://www.[employer]oo[employer] Onlineuwer.com/en/know/clinical-effectiveness-terms. 2024© UpToDate, Inc. and its affiliates and/or licensors. All rights reserved.  Copyright   © 2024 UpToDate, Inc. and/or its affiliates. All rights reserved.

## 2025-01-09 DIAGNOSIS — I10 ESSENTIAL HYPERTENSION: ICD-10-CM

## 2025-01-09 RX ORDER — AMLODIPINE BESYLATE 5 MG/1
5 TABLET ORAL DAILY
Qty: 90 TABLET | Refills: 1 | Status: SHIPPED | OUTPATIENT
Start: 2025-01-09

## 2025-01-09 RX ORDER — LISINOPRIL AND HYDROCHLOROTHIAZIDE 10; 12.5 MG/1; MG/1
1 TABLET ORAL DAILY
Qty: 90 TABLET | Refills: 1 | Status: SHIPPED | OUTPATIENT
Start: 2025-01-09

## 2025-04-21 ENCOUNTER — OFFICE VISIT (OUTPATIENT)
Dept: OBGYN CLINIC | Facility: CLINIC | Age: 60
End: 2025-04-21
Payer: COMMERCIAL

## 2025-04-21 ENCOUNTER — APPOINTMENT (OUTPATIENT)
Dept: RADIOLOGY | Age: 60
End: 2025-04-21
Attending: ORTHOPAEDIC SURGERY
Payer: COMMERCIAL

## 2025-04-21 VITALS — WEIGHT: 247 LBS | BODY MASS INDEX: 35.36 KG/M2 | HEIGHT: 70 IN

## 2025-04-21 DIAGNOSIS — M25.561 RIGHT KNEE PAIN, UNSPECIFIED CHRONICITY: ICD-10-CM

## 2025-04-21 DIAGNOSIS — M17.11 PRIMARY OSTEOARTHRITIS OF RIGHT KNEE: Primary | ICD-10-CM

## 2025-04-21 PROCEDURE — 99214 OFFICE O/P EST MOD 30 MIN: CPT | Performed by: ORTHOPAEDIC SURGERY

## 2025-04-21 PROCEDURE — 73562 X-RAY EXAM OF KNEE 3: CPT

## 2025-04-21 PROCEDURE — 20610 DRAIN/INJ JOINT/BURSA W/O US: CPT

## 2025-04-21 RX ADMIN — TRIAMCINOLONE ACETONIDE 80 MG: 40 INJECTION, SUSPENSION INTRA-ARTICULAR; INTRAMUSCULAR at 09:45

## 2025-04-21 RX ADMIN — LIDOCAINE HYDROCHLORIDE 3 ML: 10 INJECTION, SOLUTION INFILTRATION; PERINEURAL at 09:45

## 2025-04-21 NOTE — PROGRESS NOTES
Sports Medicine and Shoulder Surgery    Yovany Cade, 59 y.o. male   MRN# [id number]   : 1965        Assessment & Plan  Right knee pain, unspecified chronicity    Orders:    XR knee 3 vw right non injury; Future    Primary osteoarthritis of right knee    Orders:    Large joint arthrocentesis: R knee    Ambulatory Referral to Physical Therapy; Future         Differentials for the patient's knee include: Osteoarthritis, Medial Meniscus Tear  Recommend acetaminophen 500 mg every 6 hours as needed for breakthrough pain  Advise activity modification by avoiding heavy pivoting, cutting, twisting, or activities that exacerbate pain   Encourage light activities within pain tolerance to avoid stiffness  We will refer patient to physical therapy who can focus on knee mobility and strengthening  Patient opted for corticosteroid injection after benefits and risks discussed  Follow up: as needed  If any issues, questions, or concerns arise between now and the next appointment, we have encouraged the patient contact our team.    Large joint arthrocentesis: R knee  [facility] Protocol:  procedure performed by consultantConsent: Verbal consent obtained.  Risks and benefits: risks, benefits and alternatives were discussed  Consent given by: patient  Patient understanding: patient states understanding of the procedure being performed  Patient consent: the patient's understanding of the procedure matches consent given  Site marked: the operative site was marked  Patient identity confirmed: verbally with patient  Supporting Documentation  Indications: pain   Procedure Details  Location: knee - R knee  Needle size: 22 G  Ultrasound guidance: no  Approach: lateral  Medications administered: 80 mg triamcinolone acetonide 40 mg/mL; 3 mL lidocaine 1 %    Patient tolerance: patient tolerated the procedure well with no immediate complications  Dressing:  Sterile dressing applied                        Chief Complaint:    Right  Knee Pain and Dysfunction    Subjective:   Patient comes in for evaluation of the knee.  Patient said for the past month he has been dealing with diffuse right knee pain.  He said this started after hyperflexion injury of the knee.  He reports on and off swelling.  Patient reports the pain deep within the knee.  Patient does report a history of flareups of pain and swelling in the knee for the past several years.  He reports history of ACL surgery done in 1995.  He does not believe he has ever had a steroid injection into the right knee.    Physical Examination:    Musculoskeletal: right Knee Examination:  General: The patient is alert, oriented, and pleasant to interact with.  Patient ambulates with Normal gait pattern  Assistive Device: No  Alignment: normal  Skin is warm and dry to touch with no signs of erythema, ecchymosis, or infection   Effusion: minimal  ROM: 0° - 135°  verus 0° - 135° on contralateral side  MMT: deferred  TTP: Medial joint line  Flexor and extensor mechanisms are intact   Knee is stable to varus and valgus stress  Eduardo's Test Negative    Lachman's Test - 1B  Calf compartments are soft and supple  2+ DP and PT pulses with brisk capillary refill to the toes  Sural, saphenous, tibial, superficial, and deep peroneal motor and sensory distributions intact  Sensation light touch intact distally       Imaging Studies:  Independent interpretation of the knee x-rays demonstrates moderate to severe tricompartmental degenerative changes, previous hardware from likely ACL procedure       Allergies:  Allergies   Allergen Reactions    Pollen Extract      Plants       Medications:    Current Outpatient Medications:     amLODIPine (NORVASC) 5 mg tablet, TAKE ONE TABLET BY MOUTH EVERY DAY, Disp: 90 tablet, Rfl: 1    fexofenadine (ALLEGRA) 180 MG tablet, Take 180 mg by mouth daily  , Disp: , Rfl:     lisinopril-hydrochlorothiazide (PRINZIDE,ZESTORETIC) 10-12.5 MG per tablet, TAKE ONE TABLET BY MOUTH  EVERY DAY, Disp: 90 tablet, Rfl: 1    sildenafil (VIAGRA) 100 mg tablet, TAKE ONE TABLET BY MOUTH DAILY AS NEEDED FOR ERECTILE DYSFUNCTION, Disp: 90 tablet, Rfl: 1    Past Medical History:  Past Medical History:   Diagnosis Date    Allergic 1980    COVID 2022    GERD (gastroesophageal reflux disease)     Hypertension     Impingement syndrome of right shoulder 10/03/2019    Seasonal allergies         Past Surgical History:  Past Surgical History:   Procedure Laterality Date    BACK SURGERY  2005    Laminectomy    KNEE SURGERY Right 1994        Family History:  Family History   Problem Relation Age of Onset    Heart disease Mother     Heart attack Mother     Heart disease Father     Stroke Father     Heart attack Father     Aortic stenosis Brother         Aortic valve replacement, carotid stenosis        Social History:  Social History     Socioeconomic History    Marital status: /Civil Union     Spouse name: Not on file    Number of children: Not on file    Years of education: Not on file    Highest education level: Not on file   Occupational History    Not on file   Tobacco Use    Smoking status: Former     Current packs/day: 0.00     Average packs/day: 0.3 packs/day for 30.0 years (9.0 ttl pk-yrs)     Types: Cigarettes     Start date:      Quit date: 2013     Years since quittin.3    Smokeless tobacco: Former     Types: Chew    Tobacco comments:     1 cigarette per week for approximately 10 years   Vaping Use    Vaping status: Never Used   Substance and Sexual Activity    Alcohol use: Yes     Alcohol/week: 10.0 standard drinks of alcohol     Types: 10 Cans of beer per week     Comment: social    Drug use: Never    Sexual activity: Yes     Partners: Female     Birth control/protection: None   Other Topics Concern    Not on file   Social History Narrative    Not on file     Social Drivers of Health     Financial Resource Strain: Not on file   Food Insecurity: Not on file   Transportation  Needs: Not on file   Physical Activity: Not on file   Stress: Not on file   Social Connections: Not on file   Intimate Partner Violence: Not on file   Housing Stability: Not on file        Review of Systems:  General- denies fever/chills  HEENT- denies hearing loss or sore throat  Eyes- denies eye pain or visual disturbances, denies red eyes  Respiratory- denies cough or SOB  Cardio- denies chest pain or palpitations  GI- denies abdominal pain  Endocrine- denies urinary frequency  Urinary- denies pain with urination  Musculoskeletal- Negative except noted above  Skin- denies rashes or wounds  Neurological- denies dizziness or headache  Psychiatric- denies anxiety or difficulty concentrating     Objective:   Body mass index is 35.44 kg/m².      ---------------------------------------------------------------------  Rinku Harding MD, PhD   Orthopedic Surgery, [facility]   Sports Medicine and Shoulder Surgery    The complexity of the medical decision making, including the comprehensive history, detailed examination and moderate risk assessment, supports coding at a Level 4 for this encounter     Scribe Attestation      I,:  Grupo Graves PA-C am acting as a scribe while in the presence of the attending physician.:       I,:  Rinku Harding MD personally performed the services described in this documentation    as scribed in my presence.:

## 2025-04-24 RX ORDER — TRIAMCINOLONE ACETONIDE 40 MG/ML
80 INJECTION, SUSPENSION INTRA-ARTICULAR; INTRAMUSCULAR
Status: COMPLETED | OUTPATIENT
Start: 2025-04-21 | End: 2025-04-21

## 2025-04-24 RX ORDER — LIDOCAINE HYDROCHLORIDE 10 MG/ML
3 INJECTION, SOLUTION INFILTRATION; PERINEURAL
Status: COMPLETED | OUTPATIENT
Start: 2025-04-21 | End: 2025-04-21

## 2025-04-29 ENCOUNTER — EVALUATION (OUTPATIENT)
Dept: PHYSICAL THERAPY | Facility: REHABILITATION | Age: 60
End: 2025-04-29
Payer: COMMERCIAL

## 2025-04-29 DIAGNOSIS — M17.11 PRIMARY OSTEOARTHRITIS OF RIGHT KNEE: ICD-10-CM

## 2025-04-29 DIAGNOSIS — M25.561 CHRONIC PAIN OF RIGHT KNEE: Primary | ICD-10-CM

## 2025-04-29 DIAGNOSIS — G89.29 CHRONIC PAIN OF RIGHT KNEE: Primary | ICD-10-CM

## 2025-04-29 PROCEDURE — 97161 PT EVAL LOW COMPLEX 20 MIN: CPT | Performed by: PHYSICAL THERAPIST

## 2025-04-29 PROCEDURE — 97110 THERAPEUTIC EXERCISES: CPT | Performed by: PHYSICAL THERAPIST

## 2025-04-29 NOTE — PROGRESS NOTES
PT Evaluation     Today's date: 2025  Patient name: Yovany Cade  : 1965  MRN: [id number]  Referring provider: Grupo Graves PA*  Dx: No diagnosis found.               Assessment  Impairments: abnormal coordination, abnormal or restricted ROM, activity intolerance, impaired physical strength and pain with function    Assessment details: Pt is a pleasant 59 y.o. male presenting to outpatient physical therapy with Chronic pain of right knee  (primary encounter diagnosis). Pt presents with pain, decreased right knee flexion range of motion, decreased LE strength, and decreased tolerance to activity. Displays positive (+) Thessaly's orthopedic test in standing. Displays movement impairment diagnosis of right knee joint hypomobility dysfunction. Issued HEP. Pt is a good candidate for outpatient physical therapy and would benefit from skilled physical therapy to address limitations and to achieve goals. Thank you for this referral.   Understanding of Dx/Px/POC: good     Prognosis: good    Goals  Short-Term Goals (4 weeks)   1. Patient will decrease worst rating of pain by 25% to improve quality of life.  2. Patient will increase strength by 1/2 MMT to improve quality of life with improved efficiency of daily activities.  3. Patient will improve ROM by 25% indicating improved mobility of affected area.    Long-Term Goals (8 weeks)   1. Patient will decrease pain by 50% at worst in comparison to IE indicating significant reduction in pain and improved quality of life.  2. Patient will demonstrate strength WFL compared to IE levels indicating ability to independently manage pain symptoms to accomplish daily activities.   3. Patient will be independent with HEP with good form accomplished.      Plan  Patient would benefit from: PT eval and skilled PT  Planned modality interventions: cryotherapy and thermotherapy: hydrocollator packs    Planned therapy interventions: IADL retraining, body mechanics  training, flexibility, functional ROM exercises, home exercise program, neuromuscular re-education, manual therapy, postural training, strengthening, stretching, therapeutic activities, therapeutic exercise, joint mobilization and IASTM    Frequency: 1-2x week  Duration in weeks: 4  Treatment plan discussed with: patient      Subjective Evaluation    History of Present Illness  Mechanism of injury: 25  Pt comes to therapy reporting chronic history of many right knee injuries, including h/o right ACL reconstruction ~30 years ago. Notes he was on a trip to [place] recently, stating he had considerable pain walking around. Consulted physician upon returning home, noting he had an injection performed, with minimal changes noted. States his primary complaints and limitations include lifting right knee and flexing leg in order to don/tie shoes, descending stairs, and squatting. States his symptoms are located deep into lateral aspect of central knee joint. Denies locking. Denies paresthesias. Denies symptoms proximal or distal to knee.   GOALS: reduce pain to be able to complete ADLs without limitations or pain  Patient Goals  Patient goals for therapy: decreased pain, increased motion and increased strength    Pain  Current pain ratin  At worst pain rating: 10        Objective     Active Range of Motion   Left Knee   Flexion: 130 degrees   Extension: WFL    Right Knee   Flexion: 100 degrees with pain  Extensor lag: 10 degrees     Passive Range of Motion     Right Knee   Flexion: 100 degrees with pain  Left Ankle/Foot    Dorsiflexion (kf): WFL    Right Ankle/Foot    Dorsiflexion (kf): WFL     Strength/Myotome Testing     Left Hip   Planes of Motion   Flexion: 4  Extension: 4-  Abduction: 4  External rotation: 4-  Internal rotation: 4    Right Hip   Planes of Motion   Flexion: 4  Extension: 4-  Abduction: 4-  External rotation: 4-  Internal rotation: 4    Left Knee   Flexion: 4  Extension: 4    Right Knee  "  Flexion: 3+  Extension: 4    Tests     Left Hip   Positive Barbra.   Negative MARIO.   Modified Fernando: Positive.     Right Hip   Positive Barbra.   Negative MARIO.   Modified Fernando: Positive.     Right Knee   Positive Thessaly's test at 20 degrees.   Negative anterior drawer, lateral Eduardo, medial Eduardo, posterior drawer, Thessaly's test at 5 degrees, valgus stress test at 0 degrees, valgus stress test at 30 degrees, varus stress test at 0 degrees and varus stress test at 30 degrees.     Additional Tests Details  04/29/25  Squat - with UE support, WFL  Heel raise- 75% height bilat               Precautions:   Patient Active Problem List   Diagnosis    Essential hypertension    Hyperlipidemia    Class 2 obesity in adult    Erectile dysfunction    Annual physical exam    Mass in neck     Allergies   Allergen Reactions    Pollen Extract      Plants        Daily Treatment Diary    Date 4/29            Re-Eval IE               Manuals    Knee flex mobs                                                    Neuro Re-Ed     Bridges c TB             Clamshells c TB             Quadruped hydrants c TB             Side plank c clam                                                    Ther Ex    Side stepping c TB             Wall hip abd iso             Lat step downs             Lunge sliders             SLR - flex, abd             EOT hip flexor stretch 30\"x3            Seated HS stretch 30\"x3                         Ther Activity    Bike                           Gait Training                              Modalities                                Access Code: [email or web address]  URL: [email or web address]  Date: 04/29/2025  Prepared by: Stephen Griffin    Exercises  - Gastroc Stretch on Wall  - 2 x daily - 3 reps - 30 hold  - Seated Hamstring Stretch  - 2 x daily - 3 reps - 30 hold  - Seated Knee Flexion Slide  - 2 x daily - 10 reps - 5 hold  - Modified Fernando Stretch  - 2 x daily - 3 reps - 30 hold         "

## 2025-04-30 ENCOUNTER — APPOINTMENT (OUTPATIENT)
Dept: LAB | Age: 60
End: 2025-04-30
Payer: COMMERCIAL

## 2025-04-30 ENCOUNTER — APPOINTMENT (OUTPATIENT)
Dept: LAB | Age: 60
End: 2025-04-30
Attending: PREVENTIVE MEDICINE
Payer: COMMERCIAL

## 2025-04-30 DIAGNOSIS — Z12.5 SCREENING FOR PROSTATE CANCER: ICD-10-CM

## 2025-04-30 DIAGNOSIS — Z00.8 HEALTH EXAMINATION IN POPULATION SURVEY: ICD-10-CM

## 2025-04-30 DIAGNOSIS — I10 ESSENTIAL HYPERTENSION: ICD-10-CM

## 2025-04-30 LAB
ALBUMIN SERPL BCG-MCNC: 4 G/DL (ref 3.5–5)
ALP SERPL-CCNC: 64 U/L (ref 34–104)
ALT SERPL W P-5'-P-CCNC: 23 U/L (ref 7–52)
ANION GAP SERPL CALCULATED.3IONS-SCNC: 7 MMOL/L (ref 4–13)
AST SERPL W P-5'-P-CCNC: 17 U/L (ref 13–39)
BILIRUB SERPL-MCNC: 0.64 MG/DL (ref 0.2–1)
BUN SERPL-MCNC: 15 MG/DL (ref 5–25)
CALCIUM SERPL-MCNC: 8.7 MG/DL (ref 8.4–10.2)
CHLORIDE SERPL-SCNC: 100 MMOL/L (ref 96–108)
CHOLEST SERPL-MCNC: 186 MG/DL (ref ?–200)
CO2 SERPL-SCNC: 29 MMOL/L (ref 21–32)
CREAT SERPL-MCNC: 0.86 MG/DL (ref 0.6–1.3)
EST. AVERAGE GLUCOSE BLD GHB EST-MCNC: 137 MG/DL
GFR SERPL CREATININE-BSD FRML MDRD: 94 ML/MIN/1.73SQ M
GLUCOSE P FAST SERPL-MCNC: 123 MG/DL (ref 65–99)
HBA1C MFR BLD: 6.4 %
HDLC SERPL-MCNC: 36 MG/DL
LDLC SERPL CALC-MCNC: 126 MG/DL (ref 0–100)
NONHDLC SERPL-MCNC: 150 MG/DL
POTASSIUM SERPL-SCNC: 3.8 MMOL/L (ref 3.5–5.3)
PROT SERPL-MCNC: 6.9 G/DL (ref 6.4–8.4)
PSA SERPL-MCNC: 0.64 NG/ML (ref 0–4)
SODIUM SERPL-SCNC: 136 MMOL/L (ref 135–147)
TRIGL SERPL-MCNC: 119 MG/DL (ref ?–150)

## 2025-04-30 PROCEDURE — 80053 COMPREHEN METABOLIC PANEL: CPT

## 2025-04-30 PROCEDURE — 83036 HEMOGLOBIN GLYCOSYLATED A1C: CPT

## 2025-04-30 PROCEDURE — 80061 LIPID PANEL: CPT

## 2025-04-30 PROCEDURE — 36415 COLL VENOUS BLD VENIPUNCTURE: CPT

## 2025-04-30 PROCEDURE — G0103 PSA SCREENING: HCPCS

## 2025-05-07 ENCOUNTER — APPOINTMENT (OUTPATIENT)
Dept: PHYSICAL THERAPY | Facility: REHABILITATION | Age: 60
End: 2025-05-07
Payer: COMMERCIAL

## 2025-05-14 ENCOUNTER — OFFICE VISIT (OUTPATIENT)
Dept: PHYSICAL THERAPY | Facility: REHABILITATION | Age: 60
End: 2025-05-14
Payer: COMMERCIAL

## 2025-05-14 DIAGNOSIS — M17.11 PRIMARY OSTEOARTHRITIS OF RIGHT KNEE: ICD-10-CM

## 2025-05-14 DIAGNOSIS — G89.29 CHRONIC PAIN OF RIGHT KNEE: Primary | ICD-10-CM

## 2025-05-14 DIAGNOSIS — M25.561 CHRONIC PAIN OF RIGHT KNEE: Primary | ICD-10-CM

## 2025-05-14 PROCEDURE — 97112 NEUROMUSCULAR REEDUCATION: CPT | Performed by: PHYSICAL THERAPIST

## 2025-05-14 PROCEDURE — 97110 THERAPEUTIC EXERCISES: CPT | Performed by: PHYSICAL THERAPIST

## 2025-05-14 NOTE — PROGRESS NOTES
"Daily Note     Today's date: 2025  Patient name: Yovany Cade  : 1965  MRN: [id number]  Referring provider: Grupo Graves PA*  Dx:   Encounter Diagnosis     ICD-10-CM    1. Chronic pain of right knee  M25.561     G89.29       2. Primary osteoarthritis of right knee  M17.11                      Subjective: Pt states he had swelling in his knee following the evaluation. Notes he had a sinus infection last week, preventing him from being able to come to therapy. Notes he attempted home exercises, however, had difficulty with seated hamstring stretch.       Objective: See treatment diary below      Assessment: Tolerated treatment well. Good challenge with glute and hip strengthening exercises. Issued updated HEP.  Patient exhibited good technique with therapeutic exercises and would benefit from continued PT      Plan: Progress treatment as tolerated.       Precautions:   Patient Active Problem List   Diagnosis    Essential hypertension    Hyperlipidemia    Class 2 obesity in adult    Erectile dysfunction    Annual physical exam    Mass in neck     Allergies   Allergen Reactions    Pollen Extract      Plants        Daily Treatment Diary    Date            Re-Eval IE               Manuals    Knee flex mobs                                                    Neuro Re-Ed     Bridges c TB  5\" 2x10           Clamshells c TB  5\" 2x10 R           Quadruped hydrants c TB             Side plank c clam                                                    Ther Ex    Side stepping c TB  HR 4 laps GTB           Wall hip abd iso  5\"x10 B           Lat step downs  1R 2x10           Lunge sliders 3-way  x5           SLR - flex, abd  5\" 2x10 ea R           EOT hip flexor stretch 30\"x3 30\"x3           Seated HS stretch 30\"x3 30\"x3                        Ther Activity    Bike   L5x5'                        Gait Training                              Modalities                                Access Code: " [email or web address]  URL: [email or web address]  Date: 05/14/2025  Prepared by: Stephen Griffin    Exercises  - Seated Hamstring Stretch  - 2 x daily - 3 reps - 30 hold  - Modified Fernando Stretch  - 2 x daily - 3 reps - 30 hold  - Clamshell  - 3 x weekly - 2 sets - 10 reps - 5 hold  - Supine Bridge  - 3 x weekly - 2 sets - 10 reps - 5 hold  - Supine Active Straight Leg Raise  - 3 x weekly - 2 sets - 10 reps - 5 hold  - Sidelying Hip Abduction  - 3 x weekly - 2 sets - 10 reps - 5 hold      Access Code: [email or web address]  URL: [email or web address]  Date: 04/29/2025  Prepared by: Stephen Griffin    Exercises  - Gastroc Stretch on Wall  - 2 x daily - 3 reps - 30 hold  - Seated Hamstring Stretch  - 2 x daily - 3 reps - 30 hold  - Seated Knee Flexion Slide  - 2 x daily - 10 reps - 5 hold  - Modified Fernando Stretch  - 2 x daily - 3 reps - 30 hold

## 2025-05-21 ENCOUNTER — OFFICE VISIT (OUTPATIENT)
Dept: PHYSICAL THERAPY | Facility: REHABILITATION | Age: 60
End: 2025-05-21
Payer: COMMERCIAL

## 2025-05-21 DIAGNOSIS — M25.561 CHRONIC PAIN OF RIGHT KNEE: Primary | ICD-10-CM

## 2025-05-21 DIAGNOSIS — M17.11 PRIMARY OSTEOARTHRITIS OF RIGHT KNEE: ICD-10-CM

## 2025-05-21 DIAGNOSIS — G89.29 CHRONIC PAIN OF RIGHT KNEE: Primary | ICD-10-CM

## 2025-05-21 PROCEDURE — 97112 NEUROMUSCULAR REEDUCATION: CPT | Performed by: PHYSICAL THERAPIST

## 2025-05-21 PROCEDURE — 97140 MANUAL THERAPY 1/> REGIONS: CPT | Performed by: PHYSICAL THERAPIST

## 2025-05-21 PROCEDURE — 97110 THERAPEUTIC EXERCISES: CPT | Performed by: PHYSICAL THERAPIST

## 2025-05-21 NOTE — PROGRESS NOTES
"Daily Note     Today's date: 2025  Patient name: Yovany Cade  : 1965  MRN: [id number]  Referring provider: Grupo Graves PA*  Dx:   Encounter Diagnosis     ICD-10-CM    1. Chronic pain of right knee  M25.561     G89.29       2. Primary osteoarthritis of right knee  M17.11                      Subjective: Pt comes to therapy stating he had minimal to no pain in knee following last session and felt pretty good for the rest of the week. States he continues to have difficulty with bending right knee to cross leg in order to don shoes.       Objective: See treatment diary below      Assessment: Tolerated treatment well. Good challenge with hip abductor strengthening. Some discomfort reported in knee on bike when dorsiflexing foot, but less discomfort when plantarflexing foot. Patient demonstrated fatigue post treatment, exhibited good technique with therapeutic exercises, and would benefit from continued PT      Plan: Progress treatment as tolerated.       Precautions:   Patient Active Problem List   Diagnosis    Essential hypertension    Hyperlipidemia    Class 2 obesity in adult    Erectile dysfunction    Annual physical exam    Mass in neck     Allergies   Allergen Reactions    Pollen Extract      Plants        Daily Treatment Diary    Date           Re-Eval IE               Manuals    Knee flex mobs   AMMY gr 4                                                 Neuro Re-Ed     Bridges c TB  5\" 2x10 GTB 5\" 2x10          Clamshells c TB  5\" 2x10 R np          Quadruped hydrants c TB             Side plank c clam                                                    Ther Ex    Side stepping c TB  HR 4 laps GTB HR 4 laps GTB          Standing gastroc stretch   30\"x3          Standing soleus stretch   30\"x3          Wall hip abd iso  5\"x10 B 5\"x10 B          Lat step downs  1R 2x10 1R 2x10          Lunge sliders 3-way  x5 x10          SLR - flex, abd  5\" 2x10 ea R 5\" 2x10 ea R          EOT " "hip flexor stretch 30\"x3 30\"x3 30\"x3          Seated HS stretch 30\"x3 30\"x3 30\"x3                       Ther Activity    Bike   L5x5' L3x10'                       Gait Training                              Modalities    CP post  x10' x10'                         Access Code: [email or web address]  URL: [email or web address]  Date: 05/14/2025  Prepared by: Stephen Griffin    Exercises  - Seated Hamstring Stretch  - 2 x daily - 3 reps - 30 hold  - Modified Fernando Stretch  - 2 x daily - 3 reps - 30 hold  - Clamshell  - 3 x weekly - 2 sets - 10 reps - 5 hold  - Supine Bridge  - 3 x weekly - 2 sets - 10 reps - 5 hold  - Supine Active Straight Leg Raise  - 3 x weekly - 2 sets - 10 reps - 5 hold  - Sidelying Hip Abduction  - 3 x weekly - 2 sets - 10 reps - 5 hold      Access Code: [email or web address]  URL: [email or web address]  Date: 04/29/2025  Prepared by: Stephen Griffin    Exercises  - Gastroc Stretch on Wall  - 2 x daily - 3 reps - 30 hold  - Seated Hamstring Stretch  - 2 x daily - 3 reps - 30 hold  - Seated Knee Flexion Slide  - 2 x daily - 10 reps - 5 hold  - Modified Fernando Stretch  - 2 x daily - 3 reps - 30 hold       "

## 2025-05-28 ENCOUNTER — OFFICE VISIT (OUTPATIENT)
Dept: PHYSICAL THERAPY | Facility: REHABILITATION | Age: 60
End: 2025-05-28
Payer: COMMERCIAL

## 2025-05-28 DIAGNOSIS — M17.11 PRIMARY OSTEOARTHRITIS OF RIGHT KNEE: ICD-10-CM

## 2025-05-28 DIAGNOSIS — G89.29 CHRONIC PAIN OF RIGHT KNEE: Primary | ICD-10-CM

## 2025-05-28 DIAGNOSIS — M25.561 CHRONIC PAIN OF RIGHT KNEE: Primary | ICD-10-CM

## 2025-05-28 PROCEDURE — 97112 NEUROMUSCULAR REEDUCATION: CPT | Performed by: PHYSICAL THERAPIST

## 2025-05-28 PROCEDURE — 97140 MANUAL THERAPY 1/> REGIONS: CPT | Performed by: PHYSICAL THERAPIST

## 2025-05-28 PROCEDURE — 97110 THERAPEUTIC EXERCISES: CPT | Performed by: PHYSICAL THERAPIST

## 2025-05-28 NOTE — PROGRESS NOTES
"Daily Note     Today's date: 2025  Patient name: Yovany Cade  : 1965  MRN: [id number]  Referring provider: Grupo Graves PA*  Dx:   Encounter Diagnosis     ICD-10-CM    1. Chronic pain of right knee  M25.561     G89.29       2. Primary osteoarthritis of right knee  M17.11                      Subjective: Pt comes to therapy noting his knee feels almost back to his baseline before his recent trip 2 weeks ago. Notes he felt great following last session, denying swelling or exacerbation of symptoms. Denies notable pain or discomfort upon arrival to therapy today.       Objective: See treatment diary below      Assessment: Tolerated treatment well. Good challenge with exercises, particularly step downs and lunges. Patient exhibited good technique with therapeutic exercises and would benefit from continued PT      Plan: Progress treatment as tolerated.       Precautions:   Patient Active Problem List   Diagnosis    Essential hypertension    Hyperlipidemia    Class 2 obesity in adult    Erectile dysfunction    Annual physical exam    Mass in neck     Allergies   Allergen Reactions    Pollen Extract      Plants        Daily Treatment Diary    Date          Re-Eval IE               Manuals    Knee flex mobs   AMMY gr 4                                                 Neuro Re-Ed     Bridges c TB  5\" 2x10 GTB 5\" 2x10 Blue 5\" 2x10         Clamshells c TB  5\" 2x10 R np          Quadruped hydrants c TB             Side plank c clam                                                    Ther Ex    Side stepping c TB  HR 4 laps GTB HR 4 laps GTB HR 4 laps GTB         Standing gastroc stretch   30\"x3 30\"x3         Standing soleus stretch   30\"x3 30\"x3         Wall hip abd iso  5\"x10 B 5\"x10 B 5\"x10 B         Lat step downs  1R 2x10 1R 2x10 1R 2x10         Lunge sliders 3-way  x5 x10 x10         SLR - flex, abd  5\" 2x10 ea R 5\" 2x10 ea R 5\" 2x10 ea R         EOT hip flexor stretch 30\"x3 30\"x3 " "30\"x3 30\"x3         Seated HS stretch 30\"x3 30\"x3 30\"x3 30\"x3                      Ther Activity    Bike   L5x5' L3x10' L3x10'                      Gait Training                              Modalities    CP post  x10' x10' x10'                        Access Code: [email or web address]  URL: [email or web address]  Date: 05/14/2025  Prepared by: Stephen Griffin    Exercises  - Seated Hamstring Stretch  - 2 x daily - 3 reps - 30 hold  - Modified Fernando Stretch  - 2 x daily - 3 reps - 30 hold  - Clamshell  - 3 x weekly - 2 sets - 10 reps - 5 hold  - Supine Bridge  - 3 x weekly - 2 sets - 10 reps - 5 hold  - Supine Active Straight Leg Raise  - 3 x weekly - 2 sets - 10 reps - 5 hold  - Sidelying Hip Abduction  - 3 x weekly - 2 sets - 10 reps - 5 hold      Access Code: [email or web address]  URL: [email or web address]  Date: 04/29/2025  Prepared by: Stephen Griffin    Exercises  - Gastroc Stretch on Wall  - 2 x daily - 3 reps - 30 hold  - Seated Hamstring Stretch  - 2 x daily - 3 reps - 30 hold  - Seated Knee Flexion Slide  - 2 x daily - 10 reps - 5 hold  - Modified Fernando Stretch  - 2 x daily - 3 reps - 30 hold         "

## 2025-06-04 ENCOUNTER — OFFICE VISIT (OUTPATIENT)
Dept: PHYSICAL THERAPY | Facility: REHABILITATION | Age: 60
End: 2025-06-04
Payer: COMMERCIAL

## 2025-06-04 DIAGNOSIS — G89.29 CHRONIC PAIN OF RIGHT KNEE: Primary | ICD-10-CM

## 2025-06-04 DIAGNOSIS — M17.11 PRIMARY OSTEOARTHRITIS OF RIGHT KNEE: ICD-10-CM

## 2025-06-04 DIAGNOSIS — M25.561 CHRONIC PAIN OF RIGHT KNEE: Primary | ICD-10-CM

## 2025-06-04 PROCEDURE — 97112 NEUROMUSCULAR REEDUCATION: CPT | Performed by: PHYSICAL THERAPIST

## 2025-06-04 PROCEDURE — 97110 THERAPEUTIC EXERCISES: CPT | Performed by: PHYSICAL THERAPIST

## 2025-06-04 NOTE — PROGRESS NOTES
"Daily Note     Today's date: 2025  Patient name: Yovany Cade  : 1965  MRN: [id number]  Referring provider: Grupo Graves PA*  Dx:   Encounter Diagnosis     ICD-10-CM    1. Chronic pain of right knee  M25.561     G89.29       2. Primary osteoarthritis of right knee  M17.11                      Subjective: Pt comes to therapy noting difficulty yesterday when attempting to kneel down to pull weeds. Reports a lot of pressure in his knee getting into this position, despite utilizing pads as well.       Objective: See treatment diary below      Assessment: Tolerated treatment well. Challenged with lunge sliders and side plank clamshells. Patient exhibited good technique with therapeutic exercises and would benefit from continued PT      Plan: Progress treatment as tolerated.       Precautions:   Patient Active Problem List   Diagnosis    Essential hypertension    Hyperlipidemia    Class 2 obesity in adult    Erectile dysfunction    Annual physical exam    Mass in neck     Allergies   Allergen Reactions    Pollen Extract      Plants        Daily Treatment Diary    Date         Re-Eval IE               Manuals    Knee flex mobs   AMMY gr 4  Prone AMMY                                                Neuro Re-Ed     Bridges c TB  5\" 2x10 GTB 5\" 2x10 Blue 5\" 2x10 Blue 5\" 2x10        Clamshells c TB  5\" 2x10 R np          Quadruped hydrants c TB             Side plank c clam     Blue 5\" 2x10 B                                               Ther Ex    Side stepping c TB  HR 4 laps GTB HR 4 laps GTB HR 4 laps GTB HR 4 laps BTB        Standing gastroc stretch   30\"x3 30\"x3 30\"x3        Standing soleus stretch   30\"x3 30\"x3 30\"x3        Wall hip abd iso  5\"x10 B 5\"x10 B 5\"x10 B 5\"x10 B        Lat step downs  1R 2x10 1R 2x10 1R 2x10         Lunge sliders 3-way  x5 x10 x10 x10        Seated hip ER - R     Purple 5\"x20        SLR - flex, abd  5\" 2x10 ea R 5\" 2x10 ea R 5\" 2x10 ea R 5\" 2x10 ea " "R        EOT hip flexor stretch 30\"x3 30\"x3 30\"x3 30\"x3         Seated HS stretch 30\"x3 30\"x3 30\"x3 30\"x3 30\"x3                     Ther Activity    Bike   L5x5' L3x10' L3x10' L3x10'                     Gait Training                              Modalities    CP post  x10' x10' x10' x9'                       Access Code: [email or web address]  URL: [email or web address]  Date: 05/14/2025  Prepared by: Stephen Griffin    Exercises  - Seated Hamstring Stretch  - 2 x daily - 3 reps - 30 hold  - Modified Fernando Stretch  - 2 x daily - 3 reps - 30 hold  - Clamshell  - 3 x weekly - 2 sets - 10 reps - 5 hold  - Supine Bridge  - 3 x weekly - 2 sets - 10 reps - 5 hold  - Supine Active Straight Leg Raise  - 3 x weekly - 2 sets - 10 reps - 5 hold  - Sidelying Hip Abduction  - 3 x weekly - 2 sets - 10 reps - 5 hold      Access Code: [email or web address]  URL: [email or web address]  Date: 04/29/2025  Prepared by: Stephen Griffin    Exercises  - Gastroc Stretch on Wall  - 2 x daily - 3 reps - 30 hold  - Seated Hamstring Stretch  - 2 x daily - 3 reps - 30 hold  - Seated Knee Flexion Slide  - 2 x daily - 10 reps - 5 hold  - Modified Fernando Stretch  - 2 x daily - 3 reps - 30 hold           "

## 2025-06-11 ENCOUNTER — OFFICE VISIT (OUTPATIENT)
Dept: PHYSICAL THERAPY | Facility: REHABILITATION | Age: 60
End: 2025-06-11
Payer: COMMERCIAL

## 2025-06-11 DIAGNOSIS — M17.11 PRIMARY OSTEOARTHRITIS OF RIGHT KNEE: ICD-10-CM

## 2025-06-11 DIAGNOSIS — G89.29 CHRONIC PAIN OF RIGHT KNEE: Primary | ICD-10-CM

## 2025-06-11 DIAGNOSIS — M25.561 CHRONIC PAIN OF RIGHT KNEE: Primary | ICD-10-CM

## 2025-06-11 PROCEDURE — 97112 NEUROMUSCULAR REEDUCATION: CPT | Performed by: PHYSICAL THERAPIST

## 2025-06-11 PROCEDURE — 97110 THERAPEUTIC EXERCISES: CPT | Performed by: PHYSICAL THERAPIST

## 2025-06-11 NOTE — PROGRESS NOTES
"Daily Note     Today's date: 2025  Patient name: Yovany Cade  : 1965  MRN: [id number]  Referring provider: Grupo Graves PA*  Dx:   Encounter Diagnosis     ICD-10-CM    1. Chronic pain of right knee  M25.561     G89.29       2. Primary osteoarthritis of right knee  M17.11                      Subjective: Pt comes to therapy denying pain or discomfort. Denies discomfort following last treatment session.       Objective: See treatment diary below      Assessment: Tolerated treatment well. Feeling of mild instability with lunges. Good recall of program and performance of other exercises. Patient demonstrated fatigue post treatment, exhibited good technique with therapeutic exercises, and would benefit from continued PT      Plan: Progress treatment as tolerated.       Precautions:   Patient Active Problem List   Diagnosis    Essential hypertension    Hyperlipidemia    Class 2 obesity in adult    Erectile dysfunction    Annual physical exam    Mass in neck     Allergies   Allergen Reactions    Pollen Extract      Plants        Daily Treatment Diary    Date        Re-Eval IE               Manuals    Knee flex mobs   AMMY gr 4  Prone AMMY  np                                              Neuro Re-Ed     Bridges c TB  5\" 2x10 GTB 5\" 2x10 Blue 5\" 2x10 Blue 5\" 2x10 Blue 5\" 2x10       Clamshells c TB  5\" 2x10 R np          Quadruped hydrants c TB             Side plank c clam     Blue 5\" 2x10 B Blue 5\" 3x10 B                                              Ther Ex    Side stepping c TB  HR 4 laps GTB HR 4 laps GTB HR 4 laps GTB HR 4 laps BTB HR 4 laps BTB       Standing gastroc stretch   30\"x3 30\"x3 30\"x3 30\"x3       Standing soleus stretch   30\"x3 30\"x3 30\"x3 30\"x3       Wall hip abd iso  5\"x10 B 5\"x10 B 5\"x10 B 5\"x10 B 5\"x10 B       Lat step downs  1R 2x10 1R 2x10 1R 2x10         Lunge sliders 3-way  x5 x10 x10 x10 x10       Seated hip ER - R     Purple 5\"x20 Purple 5\"x20     " "  SLR - flex, abd  5\" 2x10 ea R 5\" 2x10 ea R 5\" 2x10 ea R 5\" 2x10 ea R 5\" 3x10 ea R       EOT hip flexor stretch 30\"x3 30\"x3 30\"x3 30\"x3         Seated HS stretch 30\"x3 30\"x3 30\"x3 30\"x3 30\"x3 30\"x3                    Ther Activity    Bike   L5x5' L3x10' L3x10' L3x10' L3x10'                    Gait Training                              Modalities    CP post  x10' x10' x10' x9' x10'                      Access Code: [email or web address]  URL: [email or web address]  Date: 05/14/2025  Prepared by: Stephen Griffin    Exercises  - Seated Hamstring Stretch  - 2 x daily - 3 reps - 30 hold  - Modified Fernando Stretch  - 2 x daily - 3 reps - 30 hold  - Clamshell  - 3 x weekly - 2 sets - 10 reps - 5 hold  - Supine Bridge  - 3 x weekly - 2 sets - 10 reps - 5 hold  - Supine Active Straight Leg Raise  - 3 x weekly - 2 sets - 10 reps - 5 hold  - Sidelying Hip Abduction  - 3 x weekly - 2 sets - 10 reps - 5 hold      Access Code: [email or web address]  URL: [email or web address]  Date: 04/29/2025  Prepared by: Stephen Griffin    Exercises  - Gastroc Stretch on Wall  - 2 x daily - 3 reps - 30 hold  - Seated Hamstring Stretch  - 2 x daily - 3 reps - 30 hold  - Seated Knee Flexion Slide  - 2 x daily - 10 reps - 5 hold  - Modified Fernando Stretch  - 2 x daily - 3 reps - 30 hold             "

## 2025-06-17 ENCOUNTER — OFFICE VISIT (OUTPATIENT)
Dept: PHYSICAL THERAPY | Facility: REHABILITATION | Age: 60
End: 2025-06-17
Payer: COMMERCIAL

## 2025-06-17 DIAGNOSIS — M25.561 CHRONIC PAIN OF RIGHT KNEE: Primary | ICD-10-CM

## 2025-06-17 DIAGNOSIS — G89.29 CHRONIC PAIN OF RIGHT KNEE: Primary | ICD-10-CM

## 2025-06-17 DIAGNOSIS — M17.11 PRIMARY OSTEOARTHRITIS OF RIGHT KNEE: ICD-10-CM

## 2025-06-17 PROCEDURE — 97530 THERAPEUTIC ACTIVITIES: CPT

## 2025-06-17 PROCEDURE — 97112 NEUROMUSCULAR REEDUCATION: CPT

## 2025-06-17 PROCEDURE — 97110 THERAPEUTIC EXERCISES: CPT

## 2025-06-17 NOTE — PROGRESS NOTES
"Daily Note     Today's date: 2025  Patient name: Yovany Cade  : 1965  MRN: [id number]  Referring provider: Grupo Graves PA*  Dx:   Encounter Diagnosis     ICD-10-CM    1. Chronic pain of right knee  M25.561     G89.29       2. Primary osteoarthritis of right knee  M17.11                      Subjective: Pt reports with c/o pressure/tightness in knee whenever he uses his quads. He currently denied pain prior to beginning today.      Objective: See treatment diary below      Assessment: pt tolerated treatment well. Pt continues with good recall of program and performance of other exercises. Most challenge exhibited with side plank clamshells. Patient demonstrated fatigue post treatment, exhibited good technique with therapeutic exercises, and would benefit from continued PT.      Plan: Progress treatment as tolerated.       Precautions:   Patient Active Problem List   Diagnosis    Essential hypertension    Hyperlipidemia    Class 2 obesity in adult    Erectile dysfunction    Annual physical exam    Mass in neck     Allergies   Allergen Reactions    Pollen Extract      Plants        Daily Treatment Diary    Date       Re-Eval IE               Manuals    Knee flex mobs   AMMY gr 4  Prone AMMY  np                                              Neuro Re-Ed     Bridges c TB  5\" 2x10 GTB 5\" 2x10 Blue 5\" 2x10 Blue 5\" 2x10 Blue 5\" 2x10 Blue 5\"  2x10      Clamshells c TB  5\" 2x10 R np          Quadruped hydrants c TB             Side plank c clam     Blue 5\" 2x10 B Blue 5\" 3x10 B Blue 5\" 3x10 B                                             Ther Ex    Side stepping c TB  HR 4 laps GTB HR 4 laps GTB HR 4 laps GTB HR 4 laps BTB HR 4 laps BTB HR 4 laps BTB      Standing gastroc stretch   30\"x3 30\"x3 30\"x3 30\"x3 np      Standing soleus stretch   30\"x3 30\"x3 30\"x3 30\"x3 30\"x3      Wall hip abd iso  5\"x10 B 5\"x10 B 5\"x10 B 5\"x10 B 5\"x10 B 5\"x10 B      Lat step downs  1R 2x10 1R " "2x10 1R 2x10         Lunge sliders 3-way  x5 x10 x10 x10 x10 x10      Seated hip ER - R     Purple 5\"x20 Purple 5\"x20 Purple  5\"x30      SLR - flex, abd  5\" 2x10 ea R 5\" 2x10 ea R 5\" 2x10 ea R 5\" 2x10 ea R 5\" 3x10 ea R 5\" 3x10 ea R      EOT hip flexor stretch 30\"x3 30\"x3 30\"x3 30\"x3         Seated HS stretch 30\"x3 30\"x3 30\"x3 30\"x3 30\"x3 30\"x3 30\"x3                   Ther Activity    Bike   L5x5' L3x10' L3x10' L3x10' L3x10' L3x10'                   Gait Training                              Modalities    CP post  x10' x10' x10' x9' x10' x10'                     Access Code: [email or web address]  URL: [email or web address]  Date: 05/14/2025  Prepared by: Stephen Griffin    Exercises  - Seated Hamstring Stretch  - 2 x daily - 3 reps - 30 hold  - Modified Fernando Stretch  - 2 x daily - 3 reps - 30 hold  - Clamshell  - 3 x weekly - 2 sets - 10 reps - 5 hold  - Supine Bridge  - 3 x weekly - 2 sets - 10 reps - 5 hold  - Supine Active Straight Leg Raise  - 3 x weekly - 2 sets - 10 reps - 5 hold  - Sidelying Hip Abduction  - 3 x weekly - 2 sets - 10 reps - 5 hold      Access Code: [email or web address]  URL: [email or web address]  Date: 04/29/2025  Prepared by: Stephen Griffin    Exercises  - Gastroc Stretch on Wall  - 2 x daily - 3 reps - 30 hold  - Seated Hamstring Stretch  - 2 x daily - 3 reps - 30 hold  - Seated Knee Flexion Slide  - 2 x daily - 10 reps - 5 hold  - Modified Fernando Stretch  - 2 x daily - 3 reps - 30 hold             "

## 2025-06-18 ENCOUNTER — APPOINTMENT (OUTPATIENT)
Dept: PHYSICAL THERAPY | Facility: REHABILITATION | Age: 60
End: 2025-06-18
Payer: COMMERCIAL

## 2025-07-01 DIAGNOSIS — I10 ESSENTIAL HYPERTENSION: ICD-10-CM

## 2025-07-02 RX ORDER — LISINOPRIL AND HYDROCHLOROTHIAZIDE 10; 12.5 MG/1; MG/1
1 TABLET ORAL DAILY
Qty: 90 TABLET | Refills: 0 | Status: SHIPPED | OUTPATIENT
Start: 2025-07-02

## 2025-07-02 RX ORDER — AMLODIPINE BESYLATE 5 MG/1
5 TABLET ORAL DAILY
Qty: 90 TABLET | Refills: 0 | Status: SHIPPED | OUTPATIENT
Start: 2025-07-02